# Patient Record
Sex: MALE | Race: WHITE | NOT HISPANIC OR LATINO | Employment: OTHER | URBAN - METROPOLITAN AREA
[De-identification: names, ages, dates, MRNs, and addresses within clinical notes are randomized per-mention and may not be internally consistent; named-entity substitution may affect disease eponyms.]

---

## 2018-06-06 ENCOUNTER — DOCUMENTATION (OUTPATIENT)
Dept: ONCOLOGY | Facility: CLINIC | Age: 83
End: 2018-06-06

## 2018-06-06 NOTE — PROGRESS NOTES
ONCOLOGY REFERRAL    Received a referral from Dr. Judd for CLL. Patient was seen in [place]. Called patient to inform them that we received the referral and they state that they will call me tomorrow with the name and number of the facility. Patient aware that we will review the records once received and call him back.

## 2018-06-11 ENCOUNTER — DOCUMENTATION (OUTPATIENT)
Dept: ONCOLOGY | Facility: CLINIC | Age: 83
End: 2018-06-11

## 2018-06-11 NOTE — PROGRESS NOTES
Oncology referral is reviewed.  Dr. Ghosh has requested CLL Fish, Quantitative Immunoglobin, Peripheral blood for flow cytometry.  I have called and given these recommendations to Dr. Judd's office.  I have also left a msg on pts vm to advise them of plan.

## 2018-06-19 ENCOUNTER — DOCUMENTATION (OUTPATIENT)
Dept: ONCOLOGY | Facility: CLINIC | Age: 83
End: 2018-06-19

## 2018-06-19 NOTE — PROGRESS NOTES
ONCOLOGY REFERRAL    Called to [facility] to see if the labs are back from [place]. There is nothing showing in their computers that they are back. Nurses station fax number given to them to fax results to when they are available.

## 2018-06-20 RX ORDER — MULTIVITAMIN
1 TABLET ORAL DAILY
COMMUNITY

## 2018-06-20 NOTE — PRE-PROCEDURE INSTRUCTIONS
Pre-Surgery Instructions:   Medication Instructions   • multivitamin (THERAGRAN) tablet tablet Do not take morning of surgery/procedure

## 2018-06-22 ENCOUNTER — HOSPITAL ENCOUNTER (OUTPATIENT)
Facility: HOSPITAL | Age: 83
Setting detail: OUTPATIENT SURGERY
Discharge: HOME | End: 2018-06-22
Attending: OTOLARYNGOLOGY | Admitting: OTOLARYNGOLOGY
Payer: MEDICARE

## 2018-06-22 ENCOUNTER — ANESTHESIA EVENT (OUTPATIENT)
Dept: OPERATING ROOM | Facility: HOSPITAL | Age: 83
End: 2018-06-22
Payer: MEDICARE

## 2018-06-22 ENCOUNTER — ANESTHESIA (OUTPATIENT)
Dept: OPERATING ROOM | Facility: HOSPITAL | Age: 83
End: 2018-06-22
Payer: MEDICARE

## 2018-06-22 VITALS
HEART RATE: 59 BPM | SYSTOLIC BLOOD PRESSURE: 138 MMHG | HEIGHT: 66 IN | BODY MASS INDEX: 34.16 KG/M2 | OXYGEN SATURATION: 90 % | TEMPERATURE: 98.6 F | WEIGHT: 212.52 LBS | RESPIRATION RATE: 10 BRPM | DIASTOLIC BLOOD PRESSURE: 77 MMHG

## 2018-06-22 DIAGNOSIS — C44.329 SQUAMOUS CELL CARCINOMA OF CHEEK: Primary | ICD-10-CM

## 2018-06-22 PROCEDURE — (BLANK) HC OR LEVEL 2 PROC 1ST 15MIN: Performed by: OTOLARYNGOLOGY

## 2018-06-22 PROCEDURE — 99100 ANES PT EXTEME AGE<1 YR&>70: CPT | Performed by: NURSE ANESTHETIST, CERTIFIED REGISTERED

## 2018-06-22 PROCEDURE — 6360000200 HC RX 636 W HCPCS (ALT 250 FOR IP): Performed by: NURSE ANESTHETIST, CERTIFIED REGISTERED

## 2018-06-22 PROCEDURE — 6360000200 HC RX 636 W HCPCS (ALT 250 FOR IP)

## 2018-06-22 PROCEDURE — 6360000200 HC RX 636 W HCPCS (ALT 250 FOR IP): Mod: JW | Performed by: NURSE ANESTHETIST, CERTIFIED REGISTERED

## 2018-06-22 PROCEDURE — 88332 PATH CONSLTJ SURG EA ADD BLK: CPT

## 2018-06-22 PROCEDURE — 88305 TISSUE EXAM BY PATHOLOGIST: CPT

## 2018-06-22 PROCEDURE — (BLANK) HC RECOVERY PHASE-2 1ST 1/2 HOUR ACUITY LEVEL 1: Performed by: OTOLARYNGOLOGY

## 2018-06-22 PROCEDURE — 2590000100 HC RX 259: Performed by: OTOLARYNGOLOGY

## 2018-06-22 PROCEDURE — 2500000200 HC RX 250 WO HCPCS: Performed by: NURSE ANESTHETIST, CERTIFIED REGISTERED

## 2018-06-22 PROCEDURE — 2580000300 HC RX 258: Performed by: ANESTHESIOLOGY

## 2018-06-22 PROCEDURE — 00300 ANES ALL PX INTEG H/N/PTRUNK: CPT | Performed by: NURSE ANESTHETIST, CERTIFIED REGISTERED

## 2018-06-22 PROCEDURE — (BLANK) HC OR LEVEL 2 PROC EACH ADDITIONAL MIN: Performed by: OTOLARYNGOLOGY

## 2018-06-22 PROCEDURE — 88331 PATH CONSLTJ SURG 1 BLK 1SPC: CPT

## 2018-06-22 PROCEDURE — (BLANK) HC RECOVERY PHASE-1 1ST  HOUR ACUITY LEVEL 3: Performed by: OTOLARYNGOLOGY

## 2018-06-22 RX ORDER — CEFAZOLIN SODIUM 1 G/3ML
INJECTION, POWDER, FOR SOLUTION INTRAMUSCULAR; INTRAVENOUS AS NEEDED
Status: DISCONTINUED | OUTPATIENT
Start: 2018-06-22 | End: 2018-06-22 | Stop reason: SURG

## 2018-06-22 RX ORDER — LIDOCAINE HYDROCHLORIDE AND EPINEPHRINE 10; 10 UG/ML; MG/ML
INJECTION, SOLUTION INFILTRATION; PERINEURAL AS NEEDED
Status: DISCONTINUED | OUTPATIENT
Start: 2018-06-22 | End: 2018-06-22 | Stop reason: HOSPADM

## 2018-06-22 RX ORDER — ROCURONIUM BROMIDE 50 MG/5 ML
SYRINGE (ML) INTRAVENOUS AS NEEDED
Status: DISCONTINUED | OUTPATIENT
Start: 2018-06-22 | End: 2018-06-22 | Stop reason: SURG

## 2018-06-22 RX ORDER — PROPOFOL 10 MG/ML
INJECTION, EMULSION INTRAVENOUS AS NEEDED
Status: DISCONTINUED | OUTPATIENT
Start: 2018-06-22 | End: 2018-06-22 | Stop reason: SURG

## 2018-06-22 RX ORDER — METOPROLOL TARTRATE 1 MG/ML
1 INJECTION, SOLUTION INTRAVENOUS EVERY 5 MIN PRN
Status: DISCONTINUED | OUTPATIENT
Start: 2018-06-22 | End: 2018-06-22 | Stop reason: HOSPADM

## 2018-06-22 RX ORDER — HYDROCODONE BITARTRATE AND ACETAMINOPHEN 5; 325 MG/1; MG/1
1-2 TABLET ORAL EVERY 4 HOURS PRN
Qty: 30 TABLET | Refills: 0 | Status: SHIPPED | OUTPATIENT
Start: 2018-06-22 | End: 2018-07-23

## 2018-06-22 RX ORDER — ACETAMINOPHEN 325 MG/1
TABLET ORAL
Status: COMPLETED
Start: 2018-06-22 | End: 2018-06-22

## 2018-06-22 RX ORDER — ONDANSETRON HYDROCHLORIDE 2 MG/ML
4 INJECTION, SOLUTION INTRAVENOUS ONCE
Status: COMPLETED | OUTPATIENT
Start: 2018-06-22 | End: 2018-06-22

## 2018-06-22 RX ORDER — LIDOCAINE HYDROCHLORIDE 20 MG/ML
INJECTION, SOLUTION EPIDURAL; INFILTRATION; INTRACAUDAL; PERINEURAL AS NEEDED
Status: DISCONTINUED | OUTPATIENT
Start: 2018-06-22 | End: 2018-06-22 | Stop reason: SURG

## 2018-06-22 RX ORDER — ACETAMINOPHEN 500 MG
1000 TABLET ORAL ONCE
Status: COMPLETED | OUTPATIENT
Start: 2018-06-22 | End: 2018-06-22

## 2018-06-22 RX ORDER — ONDANSETRON HYDROCHLORIDE 2 MG/ML
4 INJECTION, SOLUTION INTRAVENOUS ONCE AS NEEDED
Status: DISCONTINUED | OUTPATIENT
Start: 2018-06-22 | End: 2018-06-22 | Stop reason: HOSPADM

## 2018-06-22 RX ORDER — ACETAMINOPHEN 325 MG/1
650 TABLET ORAL ONCE
Status: COMPLETED | OUTPATIENT
Start: 2018-06-22 | End: 2018-06-22

## 2018-06-22 RX ORDER — FENTANYL CITRATE/PF 50 MCG/ML
PLASTIC BAG, INJECTION (ML) INTRAVENOUS AS NEEDED
Status: DISCONTINUED | OUTPATIENT
Start: 2018-06-22 | End: 2018-06-22 | Stop reason: SURG

## 2018-06-22 RX ORDER — SODIUM CHLORIDE, SODIUM LACTATE, POTASSIUM CHLORIDE, CALCIUM CHLORIDE 600; 310; 30; 20 MG/100ML; MG/100ML; MG/100ML; MG/100ML
100 INJECTION, SOLUTION INTRAVENOUS CONTINUOUS
Status: DISCONTINUED | OUTPATIENT
Start: 2018-06-22 | End: 2018-06-22 | Stop reason: HOSPADM

## 2018-06-22 RX ORDER — FENTANYL CITRATE/PF 50 MCG/ML
50 PLASTIC BAG, INJECTION (ML) INTRAVENOUS EVERY 5 MIN PRN
Status: DISCONTINUED | OUTPATIENT
Start: 2018-06-22 | End: 2018-06-22 | Stop reason: HOSPADM

## 2018-06-22 RX ORDER — ONDANSETRON HYDROCHLORIDE 2 MG/ML
INJECTION, SOLUTION INTRAVENOUS
Status: COMPLETED
Start: 2018-06-22 | End: 2018-06-22

## 2018-06-22 RX ADMIN — FENTANYL CITRATE 50 MCG: 50 INJECTION, SOLUTION INTRAMUSCULAR; INTRAVENOUS at 13:28

## 2018-06-22 RX ADMIN — Medication 325 MG: at 12:04

## 2018-06-22 RX ADMIN — SODIUM CHLORIDE, POTASSIUM CHLORIDE, SODIUM LACTATE AND CALCIUM CHLORIDE 100 ML/HR: 600; 310; 30; 20 INJECTION, SOLUTION INTRAVENOUS at 12:01

## 2018-06-22 RX ADMIN — ACETAMINOPHEN 325 MG: 325 TABLET ORAL at 12:01

## 2018-06-22 RX ADMIN — LIDOCAINE HYDROCHLORIDE 60 MG: 20 INJECTION, SOLUTION EPIDURAL; INFILTRATION; INTRACAUDAL; PERINEURAL at 12:16

## 2018-06-22 RX ADMIN — EPHEDRINE SULFATE 5 MG: 50 INJECTION, SOLUTION INTRAVENOUS at 12:39

## 2018-06-22 RX ADMIN — SUGAMMADEX 200 MG: 100 INJECTION, SOLUTION INTRAVENOUS at 14:07

## 2018-06-22 RX ADMIN — CEFAZOLIN SODIUM 2 G: 1 INJECTION, POWDER, FOR SOLUTION INTRAMUSCULAR; INTRAVENOUS at 12:23

## 2018-06-22 RX ADMIN — Medication 30 MG: at 12:16

## 2018-06-22 RX ADMIN — PROPOFOL 120 MG: 10 INJECTION, EMULSION INTRAVENOUS at 12:16

## 2018-06-22 RX ADMIN — SODIUM CHLORIDE, POTASSIUM CHLORIDE, SODIUM LACTATE AND CALCIUM CHLORIDE: 600; 310; 30; 20 INJECTION, SOLUTION INTRAVENOUS at 12:52

## 2018-06-22 RX ADMIN — Medication 100 MCG: at 12:26

## 2018-06-22 RX ADMIN — Medication 100 MCG: at 12:35

## 2018-06-22 RX ADMIN — PROPOFOL 10 MG: 10 INJECTION, EMULSION INTRAVENOUS at 13:58

## 2018-06-22 RX ADMIN — FENTANYL CITRATE 50 MCG: 50 INJECTION, SOLUTION INTRAMUSCULAR; INTRAVENOUS at 12:16

## 2018-06-22 RX ADMIN — ONDANSETRON HYDROCHLORIDE 4 MG: 2 INJECTION, SOLUTION INTRAVENOUS at 12:02

## 2018-06-22 RX ADMIN — ONDANSETRON 4 MG: 2 INJECTION INTRAMUSCULAR; INTRAVENOUS at 12:02

## 2018-06-22 RX ADMIN — Medication 50 MCG: at 12:50

## 2018-06-22 RX ADMIN — EPHEDRINE SULFATE 5 MG: 50 INJECTION, SOLUTION INTRAVENOUS at 12:50

## 2018-06-22 ASSESSMENT — ENCOUNTER SYMPTOMS: EXERCISE TOLERANCE: GOOD (4-7 METS)

## 2018-06-22 NOTE — DISCHARGE INSTRUCTIONS
SURGERY POST-OPERATIVE  PATIENT INSTRUCTIONS    Jose Alfredo Cheema MD  [facility] [phone]  [address] [place] [address] [address]  [place] [address]      FOLLOW-UP:  Please make an appointment with your physician in 7-10.  Call your physician immediately if you have any fevers greater than 102.5, drainage from your wound that is not clear or looks infected, persistent bleeding, or increasing pain.    WOUND CARE INSTRUCTIONS:    You may shower.  Some mild oozing of blood is expected.      DIET:  You may eat any foods that you can tolerate.  It is a good idea to eat a high fiber diet and take in plenty of fluids to prevent constipation.  If you do become constipated you may want to take a mild laxative or take ducolax tablets on a daily basis until your bowel habits are regular.      MEDICATIONS:  Try to take narcotic medications and anti-inflammatory medications, such as tylenol, ibuprofen, naprosyn, etc., with food.  This will minimize stomach upset from the medication.  Should you develop nausea and vomiting from the pain medication, or develop a rash, please discontinue the medication and contact your physician.  You should not drive, make important decisions, or operate machinery when taking narcotic pain medication.    QUESTIONS:  Please feel free to call your physician or the hospital  if you have any questions, and they will be glad to assist you. You can expect some generalized pain and tenderness around your incision

## 2018-06-22 NOTE — H&P
HISTORY AND PHYSICAL NOTE        HPI:   Simba Alcala is an 84 y.o. 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  He now has a new lesion over the right cheek that he noticed recently which was biopsied at an outside facility but showed invasive squamous cell carcinoma.      Prior to Admission medications    Medication Sig Start Date End Date Taking? Authorizing Provider   multivitamin (THERAGRAN) tablet tablet Take 1 tablet by mouth daily.   Yes Historical Provider, MD         No current facility-administered medications for this encounter.     No Known Allergies    Past Surgical History:   Procedure Laterality Date   • OTHER SURGICAL HISTORY Left 2008    LEFT EAR REMOVAL;  Mohs surgeries       History reviewed. No pertinent family history.    Past Medical History:   Diagnosis Date   • Cancer (CMS/HCC)     Prostate; CLL   • Kidney disease     40 years ago kidney stone removed   • Wears partial dentures        Social History     Social History   • Marital status:      Spouse name: N/A   • Number of children: N/A   • Years of education: N/A     Occupational History   • Not on file.     Social History Main Topics   • Smoking status: Never Smoker   • Smokeless tobacco: Never Used   • Alcohol use 8.4 oz/week     14 Shots of liquor per week   • Drug use: Unknown   • Sexual activity: Not on file     Other Topics Concern   • Not on file     Social History Narrative   • No narrative on file          REVIEW OF SYSTEMS:  Constitutional:  Negative for activity change, appetite change, chills, diaphoresis      and fever.   HENT:   Negative for congestion, ear discharge, ear pain, hearing loss,      sinus pressure, sore throat, trouble swallowing and voice change.    Eyes:    Negative for pain and visual disturbance.   Respiratory:   Negative for cough, choking, shortness of breath and stridor.    Cardiovascular:  Negative for chest pain and palpitations.   Skin:    Negative for color change  "and rash.   Allergic/  Immunologic:  Negative for environmental allergies and immunocompromised      state.   Neurological:  Negative for dizziness, syncope, facial asymmetry, speech      difficulty, weakness, light-headedness, numbness and      headaches.   Psychiatric/  Behavioral:   Negative for confusion and sleep disturbance.         Objective       /85   Pulse 69   Temp 36.7 °C (98.1 °F) (Oral)   Resp 16   Ht 1.676 m (5' 6\")   Wt 96.4 kg (212 lb 8.4 oz)   SpO2 95%   BMI 34.30 kg/m²     PHYSICAL EXAM:   General Appearance:    Alert, cooperative, no distress, appears stated age   Head:   Face:    Normocephalic, without obvious abnormality, atraumatic    Nonsyndromic, symmetric, atraumatic.  Evidence of past surger.  Right cheek with 1.5x1.5cm palpable lesion on the skin.     Eyes:    PERRL, conjunctiva/corneas clear, EOM's intact both eyes        Ears:    Right External auditory canal patent, skin in tact    Right TM in tact, no evidence of infection or effusion.    No mastoid tenderness or effusion    External ear (right) well formed without lesions or deformities.  Left ear surgically absent.     Nose:   Nares patent, septum midline, turbinates normal, mucosa pink and         moist, no drainage.  External nose well formed without lesions.   Oral:    Oropharynx:    Lips, mucosa, and tongue normal; teeth and gums normal, hard and soft palate in tact with normal development.    Tonsils 2+, nonerythematous, posterior pharyngeal wall smooth, no     masses or lesions   Neck:   Supple, symmetrical, trachea midline, no adenopathy. Thyroid   palpated with enlargement/tenderness/nodules.   Lungs:     Clear to auscultation bilaterally, respirations unlabored, no stridor   Heart:    Regular rate and rhythm, S1 and S2 normal, no murmur, rub or gallop   Neurologic:   CNII-XII intact. Face symmetric bilaterally.          X-ray Chest 2 Views    Result Date: 6/4/2018  Narrative: EXAM: Chest X-ray, two views, " Upright PA and Lateral from 06/04/2018      . CLINICAL HISTORY: Bronchitis; COMPARISON: None FINDINGS: There is clear aeration of the lungs with a normal inspiratory level. There is a normal size of the heart and pulmonary vessels. There is no pleural fluid. There are surgical clips in the left supraclavicular region.     Impression: IMPRESSION: No acute abnormality.     Ct Soft Tissue Neck With Iv Contrast    Result Date: 6/15/2018  Narrative: Exam: CT of the Neck with contrast from 06/15/2018. Clinical History:  squamous cell carcinoma Comparison(s):  None available. Technique: During the bolus administration of 75 cc of Isovue-370, helical axial imaging was performed through the neck. Dose reduction technique utilized; automatic/anatomic modulation of X-ray tube current (Auto mA). Findings: Patient has apparently had previous resection of the left year with mastoidectomy. Associated surgical changes. There are several mildly enlarged cervical lymph nodes. Mostly these are submandibular and submental but also in the left anterior jugular chain mid neck and the right level 3 and 4 location. There are several mildly enlarged supraclavicular nodes bilaterally. Airway and larynx appear maintained. There is a subcutaneous nodule overlying the right parotid gland. Left parotid gland has been removed.     Impression: IMPRESSION: 1. Multiple mild to moderately enlarged lymph nodes seen diffusely throughout the neck and in the supraclavicular fossa bilaterally. Metastatic disease and lymphoproliferative disorder would both be possibilities. 2. Subcutaneous nodule overlies the right anterior parotid gland with contiguity with the skin. This measures 1 cm. Etiology uncertain.      Active Problems:  No Active Problems: There are no active problems currently on the Problem List. Please update the Problem List and refresh.        ASSESSMENT and PLAN  Simba Alcala is a 84 y.o. male with hx of right cheek squamous cell  carcinoma.  Risks, benefits and alternatives to wide local excision discussed.  He would like to proceed.         DIANE CULVER MD  11:55 AM, 6/22/2018.

## 2018-06-22 NOTE — ANESTHESIA POSTPROCEDURE EVALUATION
Patient: Simba Alcala    Procedure Summary     Date:  06/22/18 Room / Location:  [facility] OR 03 / [facility] OR    Anesthesia Start:  1206 Anesthesia Stop:  1426    Procedure:  WIDE LOCAL EXCISION RIGHT CHEEK CANCER (Right ) Diagnosis:       SCC (squamous cell carcinoma)      (SCC (squamous cell carcinoma) [C44.92])    Surgeon:  Jose Alfredo Cheema MD Responsible Provider:  Donavan Schafer MD    Anesthesia Type:  general ASA Status:  3          Anesthesia Type: general  Last vitals  /79 (06/22/18 1424)    Temp 37 °C (98.6 °F) (06/22/18 1424)    Pulse 62 (06/22/18 1424)   Resp (!) 25 (06/22/18 1424)    SpO2 93 % (06/22/18 1424)    Pain Score        Anesthesia Post Evaluation    Patient location during evaluation: PACU  Patient participation: complete - patient participated  Level of consciousness: awake and alert  Pain management: adequate  Airway patency: patent  Anesthetic complications: no  Cardiovascular status: acceptable  Respiratory status: acceptable  Hydration status: acceptable  May dismiss recovered patient based on consultation with the appropriate physicians and/or meeting appropriate discharge criteria

## 2018-06-22 NOTE — OP NOTE
DATE OF PROCEDURE:      6/22/2018     SURGEON:      Dr. Jose Alfredo Cheema MD     PREOPERATIVE DIAGNOSIS:      1. Right cheek squamous cell carcinoma    POSTOPERATIVE DIAGNOSIS:     1. Right cheek squamous cell carcinoma     PROCEDURES:     1. Wide local excision right cheek squamous cell carcinoma.   2. Bilateral advancement flap closure of right cheek defect 4x6cm    ANESTHESIA:     General    ESTIMATED BLOOD LOSS:     10cc    INDICATIONS AND CONSENT:     The patient presented with the above diagnoses.  Risks benefits and alternatives of the above procedures were discussed with the family who consented for surgery.     COMPLICATIONS:     None     OPERATIVE DETAILS:      Informed consent was reviewed. The patient was delivered to the OR and placed in the supine position.  General anesthesia was induced.  The area over the right parotid cheek was palpated as a 1.5cm x 1.5cm nodule.  An elliptical incision was planned in this area measuring 3x4cm.  The area was infiltrated with 1% lidocaine with 1:100,000 epinephrine.  The patient was prepped and draped in the usual fashion.  An operative timeout was performed.     I made my incision in a relaxed skin line as previously planned.  Incision was taken through the epidermal and dermal layers.  The underlying preparotid fascia was visualized.  A roughly 2cm margin around the gross disease was obtained.  The specimen was circumferencially dissected and excised at the parotid fascia.  It was sent en bloc to pathology for frozen analysis.  Margins were also sent.  Due to positive margins on froze, more skin was excised and sent for permanent analysis.  The wound was irrigated thoroughly.  All bleeding points were controlled.        I then proceeded with closing the wound.  Significant undermining of the bilateral skin flaps was performed in order to obtain bilateral advancement flap closure of the 4x6cm defect.  After a tension free closure was obtained, 4-0 vicryl suture was used  to close the platysma layer followed by the dermis and epidermis.  Dermabond was used for the skin.  This concluded the procedure.  The patient was turned over to the anesthesia team for recovery.

## 2018-06-22 NOTE — ANESTHESIA PREPROCEDURE EVALUATION
"Pre-Procedure Assessment    Patient: Simba Alcala, male, 84 y.o.    Ht Readings from Last 1 Encounters:   06/22/18 1.676 m (5' 6\")     Wt Readings from Last 1 Encounters:   06/22/18 96.4 kg (212 lb 8.4 oz)       Last Vitals  /85 (06/22/18 1101)    Temp 36.7 °C (98.1 °F) (06/22/18 1101)    Pulse 69 (06/22/18 1101)   Resp 16 (06/22/18 1101)    SpO2 95 % (06/22/18 1101)    Pain Score         Problem list reviewed and Medical history reviewed           Airway   Mallampati: II  TM distance: >3 FB  Neck ROM: full      Dental      Pulmonary - negative ROS and normal exam   Cardiovascular - negative ROS  Exercise tolerance: good (4-7 METS)    Rhythm: regular  Rate: normal    Mental Status/Neuro/Psych - negative ROS   Pt is alert.        GI/Hepatic/Renal - negative ROS     Endo/Other    (+) history of cancer,   Abdominal           Social History   Substance Use Topics   • Smoking status: Never Smoker   • Smokeless tobacco: Never Used   • Alcohol use 8.4 oz/week     14 Shots of liquor per week      Hematology No results found for: WBC, RBC, MCV, HGB, HCT, PLT   Coagulation No results found for: PT, APTT, INR   General Chemistry No results found for: POCGLU, CALCIUM, BUN, CREATININE, GLUCOSE, NA, K, MG, CO2, CL, DIGOXIN  Anesthesia Plan    ASA 3   NPO status reviewed: > 6 hours    General         Induction: intravenous   Airway Planning: oral ET tube          Plan for postoperative opioid use.    Anesthetic plan and risks discussed with patient.                "

## 2018-07-22 NOTE — PROGRESS NOTES
"Asked to see patient for CLL.    This is a gentleman whose cll was diagnosed  In 2006 when he had prostate surgery.  At that time, he had a number of nodes at prostatectomy which were positive for \"lymphoma\".  He was seen by an oncologist and, despite the presence of the nodes, he was said to have stage 0 disease.  He has never been treated for the disease.  He was followed by oncology in [place] until 2010, where they returned him to follow with his pcp.  At that time, he had a wbc of 18 k and normal platelets.       He has had recurrent problems with skin cancer, one of which was noted on the right lower face/cheek and was biopsied in [place] showing squamous carcinoma.  He was due to come see his son, who lives locally (comes for a few months in the summer) so he underwent surgery with Dr. Cheema.  Prior to his surgery, he noted a cough and, since he did not want surgery delayed, he went to [facility] urgent care where he was given antibiotics.  A CBC was drawn, showing 48.8 white cell count with 89 percent lymphocytes, Hb was normal and platelets were 118K (no MPV given).      On 6/22, he underwent a wide local excision of the right cheek squamous carcinoma with flap closure.  After that procedure, he had a PET which showed a prominent nodes in the neck without excess FDG uptake.  Low level activity was seen at the operative site.  He was noted to have splenomegaly and prominent bilateral inguinal nodes and slight increased FDG activity in the thyroid.  US of the thyroid was felt to show a benign appearing nodule with no further followup recommended.      He is here out of concern for the increase in his white count.      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  Hyperlipidemia, " basal cell carcinoma of the skin.    Medications are as per EPIC record and are reviewed.    Allergies are none    Family history not contributory    Social history: lives most of the year in [place].  Accompanied by wife and living currently with son at [place].  Never smoker, drinks about 2 shots of alcohol a day.  Retired.      ROS  Otherwise negative on 14 points except for some sores around the lip line.  He has been using cortisone cream on them but they have not improved.    On exam, well developed white man in NAD.  Multiple areas of previously excised skin cancer.  Recent surgical site on right cheek.  VS as per rooming section.    Anicteric.  Left ear resected.  PERRL, EOMI, OP clear with no adenopathy in Waldeyer's ring.  Few herpetic looking lesions around lower lip.  No thrush.  Neck is supple without adenopathy in right anterior or posterior cervical (left s/p neck dissection), internal jugular, supra or infraclavicular, axillary areas.  Chest is clear bilaterally.  CV regular without M, R, or G.  Abd is soft, NT.  Cannot detect HSM due to habitus.  Ext without CCE.  No lymphedema.  Neuro Alert and oriented.  Slightly hard of hearing.  Sensorium intact.  Speech normal in content and miroslava.      Flow cytometry, CBC and PET reviewed.      Impression:    This is a gentleman with a history of stage I CLL who presents with white count more elevated than it has been in the past.  He has recently had a surgical procedure and this could account for some of the rise in white count but this could also reflect natural progression of the disease.  More concerning is the slight decline in platelet count.  I find no acute indications for treatment---elevated white count alone is not an indication for treatment.  He lacks evidence for painful or obstructing adenopathy.      The decline in platelet count is concerning however.  I would like to recheck his platelet count and ascertain whether the decline in  platelet count is due to splenomegaly (which I doubt since his hemoglobin is normal) or due to development of antiplatelet antibody (ITP) as is common in CLL.  I also recommend rechecking a platelet count at about the 6 week conner---he will leave for [place] soon after that and if platelets persist low or decline further, he may need evaluation by a heme/onc on his return.      We discussed general recommendations for persons with CLL--he is faithful in getting flu vaccine and has received pneumovax. I advised early intervention for febrile illnesses.    I explained all of the above to the patient and his wife and they voice understanding and wish to proceed with this plan of care.  I answered his and his wife's questions to the best of my ability.      With respect to the lesions around his lower lip, I suspect they are herpetic.  I recommended zovirax ointment and placed a prescription.    Thank you for allowing me to meet this very nice man.      ADDENDUM:  White count 43.8 with ANC 35.9K.  Hb 14.1, platelets 94K with normal MPV.  This looks more consistent with splenic sequestration but await cell bound platelet antibody testing (ordered)

## 2018-07-23 ENCOUNTER — OFFICE VISIT NO LOS (OUTPATIENT)
Dept: ONCOLOGY | Facility: CLINIC | Age: 83
End: 2018-07-23
Payer: MEDICARE

## 2018-07-23 ENCOUNTER — OFFICE VISIT (OUTPATIENT)
Dept: ONCOLOGY | Facility: CLINIC | Age: 83
End: 2018-07-23
Payer: MEDICARE

## 2018-07-23 ENCOUNTER — APPOINTMENT (OUTPATIENT)
Dept: ONCOLOGY | Facility: CLINIC | Age: 83
End: 2018-07-23
Payer: MEDICARE

## 2018-07-23 VITALS
SYSTOLIC BLOOD PRESSURE: 156 MMHG | OXYGEN SATURATION: 94 % | DIASTOLIC BLOOD PRESSURE: 67 MMHG | HEIGHT: 66 IN | BODY MASS INDEX: 34.23 KG/M2 | HEART RATE: 56 BPM | TEMPERATURE: 98.9 F | WEIGHT: 213 LBS

## 2018-07-23 DIAGNOSIS — C91.10 CLL (CHRONIC LYMPHOCYTIC LEUKEMIA) (CMS/HCC): ICD-10-CM

## 2018-07-23 DIAGNOSIS — C91.10 CLL (CHRONIC LYMPHOCYTIC LEUKEMIA) (CMS/HCC): Primary | ICD-10-CM

## 2018-07-23 LAB
BASOPHILS # BLD MANUAL: 0.4 10*3/UL
BASOPHILS NFR BLD MANUAL: 1 % (ref 0–2)
ERYTHROCYTE [DISTWIDTH] IN BLOOD BY AUTOMATED COUNT: 15.1 % (ref 11.5–15)
HCT VFR BLD AUTO: 41.7 % (ref 38–50)
HGB BLD-MCNC: 14.1 G/DL (ref 13.2–17.2)
LYMPHOCYTES # BLD MANUAL: 35.9 10*3/UL
LYMPHOCYTES NFR BLD MANUAL: 82 % (ref 15–47)
MCH RBC QN AUTO: 32.5 PG (ref 29–34)
MCHC RBC AUTO-ENTMCNC: 33.9 G/DL (ref 32–36)
MCV RBC AUTO: 96 FL (ref 82–97)
NEUTROPHILS # BLD MANUAL: 3.5 10*3/UL
NEUTS SEG # BLD MANUAL: 3.5 10*3/UL
NEUTS SEG NFR BLD MANUAL: 8 % (ref 46–70)
PLATELET # BLD AUTO: 94 10*3/UL (ref 130–350)
PLATELET # BLD EST: (no result) 10*3/UL
PMV BLD AUTO: 8.9 FL (ref 6.9–10.8)
RBC # BLD AUTO: 4.34 10*6/ΜL (ref 4.1–5.8)
RBC MORPH BLD: NORMAL
TOTAL CELLS COUNTED BLD: 100 CELLS
VARIANT LYMPHS # BLD MANUAL: 3.9 10*3/UL
VARIANT LYMPHS NFR BLD: 9 % (ref 0–1)
WBC # BLD AUTO: 43.8 10*3/UL (ref 3.7–9.6)
WBC NRBC COR # BLD: 43.8 10*3/UL

## 2018-07-23 PROCEDURE — 85025 COMPLETE CBC W/AUTO DIFF WBC: CPT | Performed by: INTERNAL MEDICINE

## 2018-07-23 PROCEDURE — 99202 OFFICE O/P NEW SF 15 MIN: CPT

## 2018-07-23 PROCEDURE — 99205 OFFICE O/P NEW HI 60 MIN: CPT | Performed by: INTERNAL MEDICINE

## 2018-07-23 PROCEDURE — 36415 COLL VENOUS BLD VENIPUNCTURE: CPT

## 2018-07-23 RX ORDER — HYDROCORTISONE 1 %
1 CREAM (GRAM) TOPICAL 2 TIMES DAILY
COMMUNITY
End: 2019-09-17 | Stop reason: DRUGHIGH

## 2018-07-23 RX ORDER — FLUOROURACIL 50 MG/G
1 CREAM TOPICAL AS NEEDED
COMMUNITY
End: 2019-06-19 | Stop reason: ALTCHOICE

## 2018-07-23 RX ORDER — ACYCLOVIR 50 MG/G
1 OINTMENT TOPICAL
Qty: 15 G | Refills: 0 | Status: SHIPPED | OUTPATIENT
Start: 2018-07-23 | End: 2018-07-30

## 2018-07-23 ASSESSMENT — PAIN SCALES - GENERAL: PAINLEVEL: 0-NO PAIN

## 2018-07-23 NOTE — PSYCHOTHERAPY
Introductory Visit     met with patient and his wife, Dilcia.  explained the role of the Patient Support Team. Patient did not have any questions or concerns at this time.     No needs identified at this time.  will assist as needed.

## 2018-07-27 ENCOUNTER — OFFICE VISIT NO LOS (OUTPATIENT)
Dept: RADIATION ONCOLOGY | Facility: CLINIC | Age: 83
End: 2018-07-27
Payer: MEDICARE

## 2018-07-27 ENCOUNTER — APPOINTMENT (OUTPATIENT)
Dept: ONCOLOGY | Facility: CLINIC | Age: 83
End: 2018-07-27
Payer: MEDICARE

## 2018-07-27 VITALS
WEIGHT: 213 LBS | HEIGHT: 65 IN | BODY MASS INDEX: 35.49 KG/M2 | DIASTOLIC BLOOD PRESSURE: 75 MMHG | OXYGEN SATURATION: 96 % | HEART RATE: 56 BPM | SYSTOLIC BLOOD PRESSURE: 163 MMHG | TEMPERATURE: 97.3 F

## 2018-07-27 DIAGNOSIS — C44.329 SQUAMOUS CELL CARCINOMA OF SKIN OF CHEEK: Primary | ICD-10-CM

## 2018-07-27 DIAGNOSIS — C91.10 CLL (CHRONIC LYMPHOCYTIC LEUKEMIA) (CMS/HCC): ICD-10-CM

## 2018-07-27 PROCEDURE — 99204 OFFICE O/P NEW MOD 45 MIN: CPT

## 2018-07-27 PROCEDURE — 36415 COLL VENOUS BLD VENIPUNCTURE: CPT

## 2018-07-27 ASSESSMENT — PAIN SCALES - GENERAL: PAINLEVEL: 0-NO PAIN

## 2018-07-27 NOTE — CONSULTATION
Radiation Oncology Consult    Requesting Physician: Jose Alfredo Cheema MD  Date of Service: 7/27/2018    Simba Alcala presents today for consultation as requested by Jose Alfredo Cheema MD, regarding treatment of:  Encounter Diagnosis   Name Primary?   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 male who is seen in consultation today for skin cancer involving the right cheek.  Patient has history of numerous skin cancers including aggressive and recurrent disease in the left side of the face around the left ear which required removal of the left ear with skin grafting a number of years ago.  He was recently found to have a nodule or mass just anterior to the right ear which was biopsied in [place] in the spring and was found to be a squamous cell carcinoma.  The patient [facility] his summ[facility] and when he arrived he made arrangements to see Dr. Cheema.  On June 22 the patient had wide excision of the mass.  Pathology reveals invasive moderately differentiated squamous cell carcinoma.  The tumor initially was focally involving the medial and lateral margins.  The tumor was also close to the deep margin and less than 1 mm.  Additional margins were taken including an anterior margin which was negative for malignancy and a reexcision which revealed actinic keratosis and hypertrophic scar but no malignancy.  Final margins are considered negative.    The patient also has a history of CLL which has not been treated.  A previous CT scan revealed lymphadenopathy in the neck on both sides.  PET scan was ordered and the patient was referred to our clinic to discuss the possibility of adjuvant treatment.  The PET scan reveals low-level FDG activity at the right cheek consistent with postsurgical change.  There is no definite evidence of regional or distant metastatic disease.  Enlarged lymph nodes in numerous locations are not FDG  avid and are potentially consistent with CLL activity.  There is some increased FDG activity in the left thyroid lobe.  Ultrasound of the thyroid was then obtained and is apparently considered benign with no further evaluation recommended.  The patient has followed locally in medical oncology for management of his CLL which again has not required treatment.  He is healing well from his surgery and has no complaints.  He and his wife have not noticed any evidence of recurrence at the site of recent resection.    PAST RADIATION HISTORY: Yes Radiation Treatments Patient's record has no active or historical radiation treatments documented.    CARDIAC DEVICE: No    PAST MEDICAL HISTORY:   Past Medical History:   Diagnosis Date   • Cancer (CMS/HCC)     Prostate; CLL   • Kidney disease     40 years ago kidney stone removed   • Wears partial dentures         PAST SURGICAL HISTORY:   Past Surgical History:   Procedure Laterality Date   • OTHER SURGICAL HISTORY Left 2008    LEFT EAR REMOVAL;  Mohs surgeries   • SKIN LESION EXCISION Right 06/22/2018    Squamous cell R cheek        GYNECOLOGICAL HISTORY (if applicable): NA     FAMILY HISTORY:   Family History   Problem Relation Age of Onset   • Cancer Brother         mouth cancer/unknown   • Heart attack Mother    • Brain cancer Father         SOCIAL HISTORY:   Social History     Social History   • Marital status:      Spouse name: N/A   • Number of children: N/A   • Years of education: N/A     Occupational History   • Not on file.     Social History Main Topics   • Smoking status: Never Smoker   • Smokeless tobacco: Never Used   • Alcohol use 8.4 oz/week     14 Shots of liquor per week   • Drug use: Unknown   • Sexual activity: Not on file     Other Topics Concern   • Not on file     Social History Narrative   • No narrative on file    Counseling given: Not Answered      ALLERGIES:   Allergies as of 07/27/2018   • (No Known Allergies)        MEDICATIONS:   Current  "Outpatient Prescriptions   Medication Sig Dispense Refill   • acyclovir (ZOVIRAX) 5 % ointment Apply 1 application topically 6 (six) times a day for 7 days. Space applications every 3 hours. 15 g 0   • fluorouracil (EFUDEX) 5 % cream Apply 1 application topically as needed.     • hydrocortisone 1 % cream Apply 1 application topically 2 (two) times a day.     • multivitamin (THERAGRAN) tablet tablet Take 1 tablet by mouth daily.     • pyridoxine HCl, vitamin B6, (VITAMIN B-6 ORAL) Take 1 tablet by mouth daily.     • vit A,C & E-lutein-minerals (OCUVITE WITH LUTEIN) 1,000 unit-200 mg-60 unit-2 mg tablet Take 1 tablet by mouth daily.       No current facility-administered medications for this visit.        REVIEW OF SYSTEMS:   Review of Systems   All other systems reviewed and are negative.  consult questionnaire reviewed      The Karnofsky performance scale today is 90, Able to carry on normal activity; minor signs or symptoms of disease (ECOG equivalent 0).     PAIN: None          Objective    PHYSICAL EXAM:   /75   Pulse 56   Temp 36.3 °C (97.3 °F)   Ht 1.651 m (5' 5\")   Wt 96.6 kg (213 lb)   SpO2 96%   BMI 35.45 kg/m²    Body mass index is 35.45 kg/m².     Physical Exam   Constitutional: No distress.   Skin: He is not diaphoretic.   numerous surgical scars on head and neck including large graft replacing left ear.  Most recent surgical incision is healing well with no evidence of local recurrence.  There is some soft tissue edema around the superior extent of the surgical site.      DIAGNOSTIC REPORTS REVIEWED:   Imaging: As above  Laboratory/Pathology: As above  Procedure: None      Assessment/Plan    IMPRESSION:   84-year-old man with multiple skin cancers who has undergone a recent resection for squamous cell cancer of the right cheek with a close deep margin    PLAN:   While the deep margin is close, additional tissue removed at the time of the wide excision was negative and final margins are " considered negative.  There is no evidence of perineural invasion or other risk factors noted on the pathology report.  I have not recommended adjuvant radiation but have counseled the patient and his wife that there is a risk of local recurrence which could be 5-10%.  They will need to observe the area closely.  He will also need to follow closely with dermatology for regular skin examinations.  He will continue in medical oncology as he manages his CLL.  Patient and his wife are comfortable with these recommendations and will contact me with questions or concerns as needed.       Physician: LIZZIE DRAKE MD

## 2018-07-30 ENCOUNTER — APPOINTMENT (OUTPATIENT)
Dept: ONCOLOGY | Facility: CLINIC | Age: 83
End: 2018-07-30
Payer: MEDICARE

## 2018-07-30 DIAGNOSIS — C91.10 CLL (CHRONIC LYMPHOCYTIC LEUKEMIA) (CMS/HCC): ICD-10-CM

## 2018-07-30 PROCEDURE — 84999 UNLISTED CHEMISTRY PROCEDURE: CPT

## 2018-07-30 PROCEDURE — 86023 IMMUNOGLOBULIN ASSAY: CPT

## 2018-07-30 PROCEDURE — 36415 COLL VENOUS BLD VENIPUNCTURE: CPT

## 2018-08-01 LAB — MISC MAYO RESULT(REF): NORMAL

## 2018-09-04 ENCOUNTER — CLINICAL SUPPORT (OUTPATIENT)
Dept: ONCOLOGY | Facility: CLINIC | Age: 83
End: 2018-09-04
Payer: MEDICARE

## 2018-09-04 ENCOUNTER — APPOINTMENT (OUTPATIENT)
Dept: ONCOLOGY | Facility: CLINIC | Age: 83
End: 2018-09-04
Payer: MEDICARE

## 2018-09-04 DIAGNOSIS — C91.10 CLL (CHRONIC LYMPHOCYTIC LEUKEMIA) (CMS/HCC): Primary | ICD-10-CM

## 2018-09-04 DIAGNOSIS — C91.10 CLL (CHRONIC LYMPHOCYTIC LEUKEMIA) (CMS/HCC): ICD-10-CM

## 2018-09-04 LAB
ERYTHROCYTE [DISTWIDTH] IN BLOOD BY AUTOMATED COUNT: 15.1 % (ref 11.5–15)
HCT VFR BLD AUTO: 40.1 % (ref 38–50)
HGB BLD-MCNC: 13.6 G/DL (ref 13.2–17.2)
LYMPHOCYTES # BLD MANUAL: 42.9 10*3/UL
LYMPHOCYTES NFR BLD MANUAL: 93 % (ref 15–47)
MCH RBC QN AUTO: 32.1 PG (ref 29–34)
MCHC RBC AUTO-ENTMCNC: 34 G/DL (ref 32–36)
MCV RBC AUTO: 94.3 FL (ref 82–97)
MONOCYTES # BLD MANUAL: 0.5 10*3/UL
MONOCYTES NFR BLD MANUAL: 1 % (ref 5–13)
NEUTROPHILS # BLD MANUAL: 2.77 10*3/UL
NEUTS SEG # BLD MANUAL: 2.8 10*3/UL
NEUTS SEG NFR BLD MANUAL: 6 % (ref 46–70)
PLATELET # BLD AUTO: 126 10*3/UL (ref 130–350)
PLATELET # BLD EST: (no result) 10*3/UL
PMV BLD AUTO: 9.2 FL (ref 6.9–10.8)
RBC # BLD AUTO: 4.25 10*6/ΜL (ref 4.1–5.8)
RBC MORPH BLD: NORMAL
TOTAL CELLS COUNTED BLD: 100 CELLS
WBC # BLD AUTO: 46.1 10*3/UL (ref 3.7–9.6)
WBC NRBC COR # BLD: 46.1 10*3/UL

## 2018-09-04 PROCEDURE — 85025 COMPLETE CBC W/AUTO DIFF WBC: CPT

## 2018-09-04 PROCEDURE — 36415 COLL VENOUS BLD VENIPUNCTURE: CPT

## 2018-09-04 NOTE — PROGRESS NOTES
MO RN REVIEW    Patient's wife aware of results and verbalizes understanding. Wife states that the patient is always tired and takes a nap daily. Patient is leaving the 28th to go to [place] and will not be back until May or June. KW aware of the above and has reviewed the labs.

## 2018-09-05 DIAGNOSIS — C91.10 CLL (CHRONIC LYMPHOCYTIC LEUKEMIA) (CMS/HCC): Primary | ICD-10-CM

## 2018-09-20 ENCOUNTER — APPOINTMENT (OUTPATIENT)
Dept: ONCOLOGY | Facility: CLINIC | Age: 83
End: 2018-09-20
Payer: MEDICARE

## 2018-09-20 ENCOUNTER — OFFICE VISIT (OUTPATIENT)
Dept: ONCOLOGY | Facility: CLINIC | Age: 83
End: 2018-09-20
Payer: MEDICARE

## 2018-09-20 VITALS
TEMPERATURE: 98.6 F | WEIGHT: 208.8 LBS | HEART RATE: 56 BPM | HEIGHT: 66 IN | DIASTOLIC BLOOD PRESSURE: 76 MMHG | OXYGEN SATURATION: 96 % | SYSTOLIC BLOOD PRESSURE: 155 MMHG | BODY MASS INDEX: 33.56 KG/M2

## 2018-09-20 DIAGNOSIS — C91.10 CLL (CHRONIC LYMPHOCYTIC LEUKEMIA) (CMS/HCC): ICD-10-CM

## 2018-09-20 DIAGNOSIS — C91.10 CLL (CHRONIC LYMPHOCYTIC LEUKEMIA) (CMS/HCC): Primary | ICD-10-CM

## 2018-09-20 DIAGNOSIS — C44.329 SQUAMOUS CELL CARCINOMA OF SKIN OF CHEEK: ICD-10-CM

## 2018-09-20 PROBLEM — D69.6 THROMBOCYTOPENIA (CMS/HCC): Status: ACTIVE | Noted: 2018-09-20

## 2018-09-20 LAB
ALBUMIN SERPL-MCNC: 4 G/DL (ref 3.5–5.3)
ALP SERPL-CCNC: 70 U/L (ref 45–115)
ALT SERPL-CCNC: 16 U/L (ref 0–52)
ANION GAP SERPL CALC-SCNC: 7 MMOL/L (ref 3–11)
AST SERPL-CCNC: 17 U/L (ref 0–39)
BILIRUB SERPL-MCNC: 0.66 MG/DL (ref 0–1.4)
BUN SERPL-MCNC: 26 MG/DL (ref 7–25)
CALCIUM ALBUM COR SERPL-MCNC: 8.9 MG/DL (ref 8.5–10.1)
CALCIUM SERPL-MCNC: 8.9 MG/DL (ref 8.6–10.3)
CHLORIDE SERPL-SCNC: 104 MMOL/L (ref 98–107)
CO2 SERPL-SCNC: 26 MMOL/L (ref 21–32)
CREAT SERPL-MCNC: 0.96 MG/DL (ref 0.7–1.3)
EOSINOPHIL # BLD MANUAL: 2.2 10*3/UL
EOSINOPHIL NFR BLD MANUAL: 4 % (ref 0–3)
ERYTHROCYTE [DISTWIDTH] IN BLOOD BY AUTOMATED COUNT: 15.3 % (ref 11.5–15)
GFR SERPL CREATININE-BSD FRML MDRD: 75 ML/MIN/1.73M*2
GLUCOSE SERPL-MCNC: 96 MG/DL (ref 70–105)
HCT VFR BLD AUTO: 43.4 % (ref 38–50)
HGB BLD-MCNC: 13.9 G/DL (ref 13.2–17.2)
LDH SERPL L TO P-CCNC: 140 U/L (ref 87–246)
LYMPHOCYTES # BLD MANUAL: 47.6 10*3/UL
LYMPHOCYTES NFR BLD MANUAL: 88 % (ref 15–47)
MCH RBC QN AUTO: 31.2 PG (ref 29–34)
MCHC RBC AUTO-ENTMCNC: 32.1 G/DL (ref 32–36)
MCV RBC AUTO: 96.9 FL (ref 82–97)
NEUTROPHILS # BLD MANUAL: 4.33 10*3/UL
NEUTS SEG # BLD MANUAL: 4.3 10*3/UL
NEUTS SEG NFR BLD MANUAL: 8 % (ref 46–70)
PLATELET # BLD AUTO: 126 10*3/UL (ref 130–350)
PLATELET # BLD EST: ADEQUATE 10*3/UL
PMV BLD AUTO: 9.2 FL (ref 6.9–10.8)
POTASSIUM SERPL-SCNC: 4.1 MMOL/L (ref 3.5–5.1)
PROT SERPL-MCNC: 6.6 G/DL (ref 6–8.3)
RBC # BLD AUTO: 4.48 10*6/ΜL (ref 4.1–5.8)
RBC MORPH BLD: NORMAL
SODIUM SERPL-SCNC: 137 MMOL/L (ref 135–145)
TOTAL CELLS COUNTED BLD: 100 CELLS
WBC # BLD AUTO: 54.1 10*3/UL (ref 3.7–9.6)
WBC NRBC COR # BLD: 54.1 10*3/UL

## 2018-09-20 PROCEDURE — 85027 COMPLETE CBC AUTOMATED: CPT

## 2018-09-20 PROCEDURE — 36415 COLL VENOUS BLD VENIPUNCTURE: CPT

## 2018-09-20 PROCEDURE — 99212 OFFICE O/P EST SF 10 MIN: CPT

## 2018-09-20 PROCEDURE — 83615 LACTATE (LD) (LDH) ENZYME: CPT

## 2018-09-20 PROCEDURE — 85007 BL SMEAR W/DIFF WBC COUNT: CPT

## 2018-09-20 PROCEDURE — 99214 OFFICE O/P EST MOD 30 MIN: CPT | Performed by: INTERNAL MEDICINE

## 2018-09-20 PROCEDURE — 80053 COMPREHEN METABOLIC PANEL: CPT

## 2018-09-20 RX ORDER — CEPHRADINE 250 MG
CAPSULE ORAL
COMMUNITY

## 2018-09-20 ASSESSMENT — PAIN SCALES - GENERAL: PAINLEVEL: 0-NO PAIN

## 2018-09-20 NOTE — PROGRESS NOTES
CLL diagnosed in 2006 with lymphadenopathy.  His disease was called stage 0 despite lymphadenopathy.  Followed in [place] until 2010.  White count was 18,000 at that time and platelets were normal.    Returned to [place] and at time of resection of a squamous cancer of his face he had elevated white count and platelets of 118.  Seen by me in July.  White count has hovered between 48,050 6000 now and platelets somewhat low.  Lowest platelet count 94,000.  Workup for ITP was negative.  No clinical bleeding.  PET scan done at the time of his squamous carcinoma showing prominent nodes in the neck without excess FDG uptake as well as splenomegaly, bilateral inguinal nodes.    Squamous carcinomas of the skin on multiple occasions with involvement of left ear requiring removal of ear and squamous carcinoma on the right lateral face requiring resection.    He is seen today.  His wife notes that his energy level seems to be decreasing.  He seems to be slowing down somewhat.  He has had no other new problems.  He has had no headaches or bone pain, fever, chills, sweats.  He reports that he is able to do what he wants to do but if he is allowed to come in and just watch television he is tired enough to want to take a nap.    He denies any shortness of breath, cough, fever.    Past, family, and social history is as in my initial consultation.  It is without change.    Review of systems is otherwise negative for headaches, bone pain, fever, chills, sweats, bleeding, petechiae, purpura, blood in the stool, blood in the urine, nosebleeds.  Otherwise negative on 14 points.    On exam he is an alert pleasant gentleman in no acute distress.  He has had multiple skin resections.  He has HEENT exam is notable for multiple resections of carcinomas including a resection involving the left ear.  Right facial scar has a small area of dimpling in the middle and it looks like a small suture protruding.  I have alerted the patient to get  him to go back to the surgeon to look at this.  Neck is supple without adenopathy in the anterior posterior cervical, internal jugular, supra or infraclavicular, axillary areas.  Chest is clear bilaterally.  Cardiovascular is regular rhythm without murmur rub or gallop.  Abdomen soft, nontender, nondistended.  No gross mass but exam limited by habitus.  Extremities are without cyanosis or edema.  No petechiae or purpura noted.  Neurologically, he is alert and oriented.  He moves all extremities with 5/5 strength.  Sensorium is intact.  Speech is normal in content and miroslava.    Laboratories are performed today.  CBC shows a white count of 54,000.  Platelet count is 126.  Hemoglobin is 13.9.  LDH is normal.  Absolute lymphocyte count has been trending up.  In July his absolute lymphocyte count was 35,000.  In early September was 42.9.  And today is 47.6.  Chemistries are fairly unremarkable.  Normal liver and kidney function.    Impression:    This is a gentleman with chronic lymphocytic leukemia with stage I disease.  He has mild thrombocytopenia.  We have no evidence of an antiplatelet antibody at this time.  I have urged him to return to the care of his oncologist in [place] when he gets back there.  He is leaving next week.  They can help survey for additional skin cancers as well as watch his lymphocyte count and thrombocytopenia.  At this time he has no indications to treat.  I have explained to his wife that the white count although it is increasing is not an indication to treat.  Indications to treat would include symptomatic adenopathy, obstructive adenopathy, worsening anemia and thrombocytopenia.  I explained to her that although his platelet count is somewhat low it is not low enough to indicate treatment.  It is also not low enough to predispose to bleeding.    I have asked him to get a release of information so that his oncologist in [place] can get our information.    I will be happy to resume his  care when he returns to [place] next year.

## 2019-06-14 ENCOUNTER — OFFICE VISIT (OUTPATIENT)
Dept: ONCOLOGY | Facility: CLINIC | Age: 84
End: 2019-06-14
Payer: MEDICARE

## 2019-06-14 ENCOUNTER — APPOINTMENT (OUTPATIENT)
Dept: ONCOLOGY | Facility: CLINIC | Age: 84
End: 2019-06-14
Payer: MEDICARE

## 2019-06-14 VITALS — BODY MASS INDEX: 34.38 KG/M2 | WEIGHT: 213 LBS

## 2019-06-14 DIAGNOSIS — C91.10 CLL (CHRONIC LYMPHOCYTIC LEUKEMIA) (CMS/HCC): ICD-10-CM

## 2019-06-14 LAB
ALBUMIN SERPL-MCNC: 3.8 G/DL (ref 3.5–5.3)
ALP SERPL-CCNC: 58 U/L (ref 45–115)
ALT SERPL-CCNC: 29 U/L (ref 0–52)
ANION GAP SERPL CALC-SCNC: 7 MMOL/L (ref 3–11)
AST SERPL-CCNC: 121 U/L (ref 0–39)
BILIRUB SERPL-MCNC: 0.65 MG/DL (ref 0–1.4)
BUN SERPL-MCNC: 22 MG/DL (ref 7–25)
CALCIUM ALBUM COR SERPL-MCNC: 9.1 MG/DL (ref 8.6–10.3)
CALCIUM SERPL-MCNC: 8.9 MG/DL (ref 8.6–10.3)
CHLORIDE SERPL-SCNC: 106 MMOL/L (ref 98–107)
CO2 SERPL-SCNC: 24 MMOL/L (ref 21–32)
CREAT SERPL-MCNC: 0.98 MG/DL (ref 0.7–1.3)
ERYTHROCYTE [DISTWIDTH] IN BLOOD BY AUTOMATED COUNT: 15.3 % (ref 11.5–15)
GFR SERPL CREATININE-BSD FRML MDRD: 71 ML/MIN/1.73M*2
GLUCOSE SERPL-MCNC: 106 MG/DL (ref 70–105)
HCT VFR BLD AUTO: 39.8 % (ref 38–50)
HGB BLD-MCNC: 13.2 G/DL (ref 13.2–17.2)
LDH SERPL L TO P-CCNC: 365 U/L (ref 87–246)
LYMPHOCYTES # BLD MANUAL: 60.4 10*3/UL
LYMPHOCYTES NFR BLD MANUAL: 95 % (ref 15–47)
MCH RBC QN AUTO: 32.6 PG (ref 29–34)
MCHC RBC AUTO-ENTMCNC: 33 G/DL (ref 32–36)
MCV RBC AUTO: 98.8 FL (ref 82–97)
NEUTROPHILS # BLD MANUAL: 3.18 10*3/UL
NEUTS SEG # BLD MANUAL: 3.2 10*3/UL
NEUTS SEG NFR BLD MANUAL: 5 % (ref 46–70)
PLATELET # BLD AUTO: 118 10*3/UL (ref 130–350)
PLATELET # BLD EST: (no result) 10*3/UL
PMV BLD AUTO: 9.1 FL (ref 6.9–10.8)
POTASSIUM SERPL-SCNC: 4.2 MMOL/L (ref 3.5–5.1)
PROT SERPL-MCNC: 6.1 G/DL (ref 6–8.3)
RBC # BLD AUTO: 4.03 10*6/ΜL (ref 4.1–5.8)
RBC MORPH BLD: NORMAL
SODIUM SERPL-SCNC: 137 MMOL/L (ref 135–145)
TOTAL CELLS COUNTED BLD: 100 CELLS
WBC # BLD AUTO: 63.6 10*3/UL (ref 3.7–9.6)
WBC NRBC COR # BLD: 63.6 10*3/UL

## 2019-06-14 PROCEDURE — 99214 OFFICE O/P EST MOD 30 MIN: CPT | Performed by: NURSE PRACTITIONER

## 2019-06-14 PROCEDURE — 83615 LACTATE (LD) (LDH) ENZYME: CPT

## 2019-06-14 PROCEDURE — 85025 COMPLETE CBC W/AUTO DIFF WBC: CPT

## 2019-06-14 PROCEDURE — 80053 COMPREHEN METABOLIC PANEL: CPT

## 2019-06-14 PROCEDURE — 36415 COLL VENOUS BLD VENIPUNCTURE: CPT

## 2019-06-14 NOTE — PROGRESS NOTES
"Medical Oncology Progress Note  Patient Name: Simba Alcala  YOB: 1934  Date of Service: 6/14/2019    Chief Complaint :  Simba Alcala is a 85 y.o. male with CLL    HISTORY OF PRESENT ILLNESS:  CLL diagnosed in 2006 with lymphadenopathy.  His disease was called stage 0 despite lymphadenopathy.  Followed in [place] until 2010.  White count was 18,000 at that time and platelets were normal.     Returned to [place] and at time of resection of a squamous cancer of his face he had elevated white count and platelets of 118.  Seen by me in July.  White count has hovered between 48,050 6000 now and platelets somewhat low.  Lowest platelet count 94,000.  Workup for ITP was negative.  No clinical bleeding.  PET scan done at the time of his squamous carcinoma showing prominent nodes in the neck without excess FDG uptake as well as splenomegaly, bilateral inguinal nodes.     Squamous carcinomas of the skin on multiple occasions with involvement of left ear requiring removal of ear in 2008 and squamous carcinoma on the right lateral face requiring resection. Followed by Dr. Garg in [place] and Dr. Mg at [facility] in [place].     Patient's wife called yesterday stating he needed to be seen \"because he wasn't feeling well'. Patient presents today after seeing Dr. Mg a couple weeks ago for further care of his  squamous carcinoma on the right lateral face, per patient he needs \"radiation or chemotherapy on my face\". States this is what he needs to be seen for more urgently than his CLL or rising PSA, \"that stuff doesn't matter right now I just don't want to lose my other ear\".     Significant Comorbidity:  Past Medical History:   Diagnosis Date   • Cancer (CMS/HCC) (HCC)     Prostate; CLL   • Kidney disease     40 years ago kidney stone removed   • Wears partial dentures      Past Surgical History:   Procedure Laterality Date   • OTHER SURGICAL HISTORY Left 2008    LEFT EAR REMOVAL;  Mohs " surgeries   • SKIN BIOPSY Right 06/03/2019    Skin shave R tragus, invasive mod diff squamous cell ca with acantholysis, positive margins   • SKIN BIOPSY Right 06/03/2019    R lat cheek, invasive well to mod diff squamous cell ca, positive margins   • SKIN LESION EXCISION Right 06/22/2018    Squamous cell R cheek     Social History:   Social History     Socioeconomic History   • Marital status:      Spouse name: Not on file   • Number of children: Not on file   • Years of education: Not on file   • Highest education level: Not on file   Occupational History   • Not on file   Social Needs   • Financial resource strain: Not on file   • Food insecurity:     Worry: Not on file     Inability: Not on file   • Transportation needs:     Medical: Not on file     Non-medical: Not on file   Tobacco Use   • Smoking status: Never Smoker   • Smokeless tobacco: Never Used   Substance and Sexual Activity   • Alcohol use: Yes     Alcohol/week: 8.4 oz     Types: 14 Shots of liquor per week   • Drug use: Not on file   • Sexual activity: Not on file   Lifestyle   • Physical activity:     Days per week: Not on file     Minutes per session: Not on file   • Stress: Not on file   Relationships   • Social connections:     Talks on phone: Not on file     Gets together: Not on file     Attends Alevism service: Not on file     Active member of club or organization: Not on file     Attends meetings of clubs or organizations: Not on file     Relationship status: Not on file   • Intimate partner violence:     Fear of current or ex partner: Not on file     Emotionally abused: Not on file     Physically abused: Not on file     Forced sexual activity: Not on file   Other Topics Concern   • Not on file   Social History Narrative   • Not on file      Current Medications:    Current Outpatient Medications:   •  omega 3-dha-epa-fish oil 250-500-1,000 mg capsule, Take by mouth., Disp: , Rfl:   •  hydrocortisone 1 % cream, Apply 1 application  "topically 2 (two) times a day., Disp: , Rfl:   •  fluorouracil (EFUDEX) 5 % cream, Apply 1 application topically as needed., Disp: , Rfl:   •  vit A,C & E-lutein-minerals (OCUVITE WITH LUTEIN) 1,000 unit-200 mg-60 unit-2 mg tablet, Take 1 tablet by mouth daily., Disp: , Rfl:   •  pyridoxine HCl, vitamin B6, (VITAMIN B-6 ORAL), Take 1 tablet by mouth daily., Disp: , Rfl:   •  multivitamin (THERAGRAN) tablet tablet, Take 1 tablet by mouth daily., Disp: , Rfl:   Allergies:   Allergies as of 06/14/2019   • (No Known Allergies)     REVIEW OF SYSTEMS:   The ECOG performance status today is .0 - Asymptomatic  Review of Systems   Constitutional: Negative for fever.   Skin: Positive for wound (right lateral face).   Hematological: Positive for adenopathy (\"Dr. Garg said this is a lymph node by my collar bone\").     Pain: 3/10 face      Objective    PHYSICAL EXAM:   Wt 96.6 kg (213 lb)   BMI 34.38 kg/m²    Body mass index is 34.38 kg/m².   Physical Exam   Constitutional: Vital signs are normal.   Pleasant, elderly  male in no acute distress. Alert and cooperative with exam. Oriented to person, place, and time. Accompanied by wife.    HENT:   Head: Normocephalic and atraumatic.   Mouth/Throat: Oropharynx is clear and moist and mucous membranes are normal.   Left ear surgically removed   Open lesions scattered right lateral face from mid-cheek up to ear, tender to palpation    Eyes: Conjunctivae and lids are normal. No scleral icterus.   Neck: Normal range of motion. Neck supple.   Pulmonary/Chest: Effort normal.   Musculoskeletal: Normal range of motion. He exhibits no edema.   Lymphadenopathy:        Right: Supraclavicular (nontender <1 cm mobile) adenopathy present.   Skin: Skin is warm, dry and intact.   Psychiatric: He has a normal mood and affect. His speech is normal.     Imaging  External Ct Result    Result Date: 5/22/2019  Narrative: CT [facility]-5/22/2019    External Nuclear " "Medicine Result    Result Date: 5/23/2019  Narrative: [facility]23/2019    Laboratory  CBC:  Lab Results   Component Value Date    WBC 63.6 (HH) 06/14/2019    HGB 13.2 06/14/2019    HCT 39.8 06/14/2019    MCV 98.8 (H) 06/14/2019     (L) 06/14/2019    ANC 3.180 06/14/2019     CMP:  Lab Results   Component Value Date    GLUCOSE 106 (H) 06/14/2019    CALCIUM 8.9 06/14/2019     06/14/2019    K 4.2 06/14/2019    CO2 24 06/14/2019     06/14/2019    BUN 22 06/14/2019    CREATININE 0.98 06/14/2019    ANIONGAP 7 06/14/2019    BILITOT 0.65 06/14/2019     (H) 06/14/2019    ALT 29 06/14/2019    PROT 6.1 06/14/2019    ALBUMIN 3.8 06/14/2019    ALKPHOS 58 06/14/2019     LDH 6/14/19: 365 U/L        Assessment/Plan    IMPRESSION/PLAN:   1.  Squamous carcinoma on the right lateral face: I was under the impression patient needed to be seen in regard to his CLL but he actually just wants his appointment with Dr. Cao to be rescheduled earlier than it is now, right now the appointment is June 28. Discussed this with Dr. Kiley Lee's nurse who was able to get patient an appointment Wednesday, June 19.     2. CLL: Received visit notes and patient's CT CAP and bone scan results from onoclogy clinic in [place]. Reviewed these all as I thought this is what the visit was in regard to. Discussed all these results and labs with patient and his wife. According to that oncologist he is still stable and no treatment needed at this time for his CLL. Wife states, \"We aren't concerned about that right now we just want his face fixed\".  WBC, hemoglobin and platelets today stable with results form [place] in May. LDH is elevated though today.     3. Rising PSA, hx of prostate cancer:  In these records it appears the CT CAP and bone scan were actually ordered for rising PSA, no evidence of osseous disease. CT CAP showed lymphadenopathy which is consistent with prior scans. Patient " thought these scans were to evaluate CLL.    After long discussion with patient and wife going over records and scans, patient and wife just want to focus on radiation right now. Will discuss everything with Dr. Ghosh on Monday and see patient back for medical oncology visit regarding CLL and PSA.     Will return for follow-up after radiation. Advised to call the clinic with any questions or concerns before next scheduled appointment; patient verbalizes understanding and agrees to plan of care.        Signed by: SATISH KLEIN CNP

## 2019-06-15 ASSESSMENT — ENCOUNTER SYMPTOMS
ADENOPATHY: 1
WOUND: 1
FEVER: 0

## 2019-06-19 ENCOUNTER — OFFICE VISIT NO LOS (OUTPATIENT)
Dept: RADIATION ONCOLOGY | Facility: CLINIC | Age: 84
End: 2019-06-19
Payer: MEDICARE

## 2019-06-19 VITALS
SYSTOLIC BLOOD PRESSURE: 144 MMHG | TEMPERATURE: 97.4 F | BODY MASS INDEX: 33.96 KG/M2 | HEART RATE: 64 BPM | DIASTOLIC BLOOD PRESSURE: 62 MMHG | WEIGHT: 210.4 LBS | OXYGEN SATURATION: 96 %

## 2019-06-19 DIAGNOSIS — C44.329 SQUAMOUS CELL CARCINOMA OF SKIN OF CHEEK: Primary | ICD-10-CM

## 2019-06-19 PROBLEM — C61 PROSTATE CANCER (CMS/HCC): Status: ACTIVE | Noted: 2019-06-19

## 2019-06-19 PROCEDURE — G0463 HOSPITAL OUTPT CLINIC VISIT: HCPCS

## 2019-06-19 ASSESSMENT — PAIN SCALES - GENERAL: PAINLEVEL: 0-NO PAIN

## 2019-06-19 NOTE — PROGRESS NOTES
Radiation Oncology Follow-Up    Date of Service: 6/19/2019      DIAGNOSIS:  1.  Squamous cell carcinoma skin, multiple  2.  CLL  3.  Prostate cancer    ONCOLOGIC HISTORY:  1.  Multiple surgeries, skin grafting, etc  2.  Observation  3.  Proton radiation 2008      CHIEF COMPLAINT:  Nodular progressive skin cancer involving the right cheek and ear    HISTORY OF PRESENT ILLNESS:  I met this patient initially last July after he had undergone a resection of a skin cancer in the right cheek.  There were no particular high risk features after that surgery and so he underwent observation.    According to the patient's wife, he developed a relatively small recurrence over the right cheek during the winter months and had another surgery in February in [place].  He did not heal well after the surgery in February.  He developed nodularity and received steroid injections along the incision.  The nodularity progressed.  He recently returned to [place] and was evaluated by Dr. Mg in dermatology.  2 biopsies over the nodular area on the right cheek and on the right ear were recently carried out in both confirmed the presence of squamous cell carcinoma.  The patient is now referred to discuss radiation treatment.  He has noted some nodularity extending down the right side of his neck and questions the possibility of lymph node involvement in the supraclavicular area on the right.  He states that this is his primary concern and that he is not having any symptoms related to his CLL or to his prostate cancer which is biochemically recurrent based on outside records.       REVIEW OF SYSTEMS:   Review of Systems   All other systems reviewed and are negative.      Pain: none    TOBACCO USE:  Social History     Tobacco Use   Smoking Status Never Smoker   Smokeless Tobacco Never Used     Counseling given: Not Answered      ALLERGIES:   Allergies as of 06/19/2019   • (No Known Allergies)        MEDICATIONS:   Reviewed in  [facility]    PHYSICAL EXAM:   /62   Pulse 64   Temp 36.3 °C (97.4 °F)   Wt 95.4 kg (210 lb 6.4 oz)   SpO2 96%   BMI 33.96 kg/m² Body mass index is 33.96 kg/m².     Physical Exam   Constitutional: No distress.   Eyes: No scleral icterus.   Skin: He is not diaphoretic.   L ear is surgically absent.  R cheek, face, ear, and upper neck demonstrate nodular induration c/w invasive squamous carcinoma with extension to the lower R ear.  There appears to be a satellite lesion on the skin over the mid neck.  There is some nodularity in the supraclavicular region on the right side.       The Karnofsky performance scale today is 80, Normal activity with effort; some signs or symptoms of disease (ECOG equivalent 1).       ASSESSMENT:   85-year-old man with aggressive recurrent squamous cell carcinoma of the skin involving the right cheek and neck    PLAN:   Because of the aggressive and extensive nature of this patient's recurrence, I have recommended a PET CT scan in order to stage to the right face and neck more accurately.  I also recommended radiation to cover the full extent of disease in the right cheek and ear and possible extension into the right neck.  This would involve a least 30 treatments over 6 weeks.  Side effects will include fatigue and skin reaction in the treatment area.  It is possible that he may experience a sore throat and other local symptoms depending on how deep the disease turns out to be.  I have recommended that we initiate planning for radiation as soon as possible.  I do not see the role for any other treatment options because of the extent of disease.    The patient and his wife asked questions and ultimately expressed understanding of the recommendations given today.  The patient inquires about proton therapy.  I do not believe the proton therapy would be any more effective or safe than conventional photon or electron therapy.  The patient ultimately expressed an understanding and agreement  to proceed.  We will schedule a PET scan as soon as possible.    Physician: LIZZIE DRAKE MD

## 2019-06-27 ENCOUNTER — CLINICAL SUPPORT (OUTPATIENT)
Dept: RADIATION ONCOLOGY | Facility: CLINIC | Age: 84
End: 2019-06-27
Payer: MEDICARE

## 2019-06-27 ENCOUNTER — APPOINTMENT (OUTPATIENT)
Dept: RADIATION ONCOLOGY | Facility: CLINIC | Age: 84
End: 2019-06-27
Payer: MEDICARE

## 2019-06-27 PROCEDURE — 77334 RADIATION TREATMENT AID(S): CPT | Performed by: RADIOLOGY

## 2019-07-03 PROCEDURE — 77338 DESIGN MLC DEVICE FOR IMRT: CPT | Performed by: RADIOLOGY

## 2019-07-03 PROCEDURE — 77301 RADIOTHERAPY DOSE PLAN IMRT: CPT | Performed by: RADIOLOGY

## 2019-07-05 PROCEDURE — 77300 RADIATION THERAPY DOSE PLAN: CPT | Performed by: RADIOLOGY

## 2019-07-08 ENCOUNTER — APPOINTMENT (OUTPATIENT)
Dept: RADIATION ONCOLOGY | Facility: CLINIC | Age: 84
End: 2019-07-08
Payer: MEDICARE

## 2019-07-08 DIAGNOSIS — C44.329 SQUAMOUS CELL CARCINOMA OF SKIN OF OTHER PARTS OF FACE: ICD-10-CM

## 2019-07-08 LAB
RAD ONC MSQ ACTUAL FRACTIONS DELIVERED: 1
RAD ONC MSQ ACTUAL SESSION DELIVERED DOSE: 200 CGRAY
RAD ONC MSQ ACTUAL TOTAL DOSE: 200 CGRAY
RAD ONC MSQ ELAPSED DAYS: 0
RAD ONC MSQ LAST DATE: NORMAL
RAD ONC MSQ PRESCRIBED FRACTIONAL DOSE: 200 CGRAY
RAD ONC MSQ PRESCRIBED NUMBER OF FRACTIONS: 30
RAD ONC MSQ PRESCRIBED TECHNIQUE: NORMAL
RAD ONC MSQ PRESCRIBED TOTAL DOSE: 6000 CGRAY
RAD ONC MSQ PRESCRIPTION PATTERN COMMENT: NORMAL
RAD ONC MSQ START DATE: NORMAL
RAD ONC MSQ TREATMENT COURSE NUMBER: 1
RAD ONC MSQ TREATMENT SITE: NORMAL

## 2019-07-08 PROCEDURE — 77386 HC INTENSITY MODULATED RADIATION TX DLVR COMPLEX: CPT | Performed by: RADIOLOGY

## 2019-07-08 PROCEDURE — 77370 RADIATION PHYSICS CONSULT: CPT | Performed by: RADIOLOGY

## 2019-07-08 PROCEDURE — 77331 SPECIAL RADIATION DOSIMETRY: CPT | Performed by: RADIOLOGY

## 2019-07-09 ENCOUNTER — APPOINTMENT (OUTPATIENT)
Dept: RADIATION ONCOLOGY | Facility: CLINIC | Age: 84
End: 2019-07-09
Payer: MEDICARE

## 2019-07-09 ENCOUNTER — RADIATION ONCOLOGY (OUTPATIENT)
Dept: RADIATION ONCOLOGY | Facility: CLINIC | Age: 84
End: 2019-07-09
Payer: MEDICARE

## 2019-07-09 VITALS
HEART RATE: 62 BPM | OXYGEN SATURATION: 95 % | WEIGHT: 211 LBS | DIASTOLIC BLOOD PRESSURE: 77 MMHG | BODY MASS INDEX: 34.06 KG/M2 | SYSTOLIC BLOOD PRESSURE: 144 MMHG | TEMPERATURE: 97.1 F

## 2019-07-09 DIAGNOSIS — C44.329 SQUAMOUS CELL CARCINOMA OF SKIN OF OTHER PARTS OF FACE: ICD-10-CM

## 2019-07-09 LAB
RAD ONC MSQ ACTUAL FRACTIONS DELIVERED: 2
RAD ONC MSQ ACTUAL SESSION DELIVERED DOSE: 200 CGRAY
RAD ONC MSQ ACTUAL TOTAL DOSE: 400 CGRAY
RAD ONC MSQ ELAPSED DAYS: 1
RAD ONC MSQ LAST DATE: NORMAL
RAD ONC MSQ PRESCRIBED FRACTIONAL DOSE: 200 CGRAY
RAD ONC MSQ PRESCRIBED NUMBER OF FRACTIONS: 30
RAD ONC MSQ PRESCRIBED TECHNIQUE: NORMAL
RAD ONC MSQ PRESCRIBED TOTAL DOSE: 6000 CGRAY
RAD ONC MSQ PRESCRIPTION PATTERN COMMENT: NORMAL
RAD ONC MSQ START DATE: NORMAL
RAD ONC MSQ TREATMENT COURSE NUMBER: 1
RAD ONC MSQ TREATMENT SITE: NORMAL

## 2019-07-09 PROCEDURE — 77386 HC INTENSITY MODULATED RADIATION TX DLVR COMPLEX: CPT | Performed by: RADIOLOGY

## 2019-07-10 ENCOUNTER — APPOINTMENT (OUTPATIENT)
Dept: RADIATION ONCOLOGY | Facility: CLINIC | Age: 84
End: 2019-07-10
Payer: MEDICARE

## 2019-07-10 DIAGNOSIS — C44.329 SQUAMOUS CELL CARCINOMA OF SKIN OF OTHER PARTS OF FACE: ICD-10-CM

## 2019-07-10 LAB
RAD ONC MSQ ACTUAL FRACTIONS DELIVERED: 3
RAD ONC MSQ ACTUAL SESSION DELIVERED DOSE: 200 CGRAY
RAD ONC MSQ ACTUAL TOTAL DOSE: 600 CGRAY
RAD ONC MSQ ELAPSED DAYS: 2
RAD ONC MSQ LAST DATE: NORMAL
RAD ONC MSQ PRESCRIBED FRACTIONAL DOSE: 200 CGRAY
RAD ONC MSQ PRESCRIBED NUMBER OF FRACTIONS: 30
RAD ONC MSQ PRESCRIBED TECHNIQUE: NORMAL
RAD ONC MSQ PRESCRIBED TOTAL DOSE: 6000 CGRAY
RAD ONC MSQ PRESCRIPTION PATTERN COMMENT: NORMAL
RAD ONC MSQ START DATE: NORMAL
RAD ONC MSQ TREATMENT COURSE NUMBER: 1
RAD ONC MSQ TREATMENT SITE: NORMAL

## 2019-07-10 PROCEDURE — 77386 HC INTENSITY MODULATED RADIATION TX DLVR COMPLEX: CPT | Performed by: RADIOLOGY

## 2019-07-11 ENCOUNTER — APPOINTMENT (OUTPATIENT)
Dept: RADIATION ONCOLOGY | Facility: CLINIC | Age: 84
End: 2019-07-11
Payer: MEDICARE

## 2019-07-11 DIAGNOSIS — C44.329 SQUAMOUS CELL CARCINOMA OF SKIN OF OTHER PARTS OF FACE: ICD-10-CM

## 2019-07-11 LAB
RAD ONC MSQ ACTUAL FRACTIONS DELIVERED: 4
RAD ONC MSQ ACTUAL SESSION DELIVERED DOSE: 200 CGRAY
RAD ONC MSQ ACTUAL TOTAL DOSE: 800 CGRAY
RAD ONC MSQ ELAPSED DAYS: 3
RAD ONC MSQ LAST DATE: NORMAL
RAD ONC MSQ PRESCRIBED FRACTIONAL DOSE: 200 CGRAY
RAD ONC MSQ PRESCRIBED NUMBER OF FRACTIONS: 30
RAD ONC MSQ PRESCRIBED TECHNIQUE: NORMAL
RAD ONC MSQ PRESCRIBED TOTAL DOSE: 6000 CGRAY
RAD ONC MSQ PRESCRIPTION PATTERN COMMENT: NORMAL
RAD ONC MSQ START DATE: NORMAL
RAD ONC MSQ TREATMENT COURSE NUMBER: 1
RAD ONC MSQ TREATMENT SITE: NORMAL

## 2019-07-11 PROCEDURE — 77386 HC INTENSITY MODULATED RADIATION TX DLVR COMPLEX: CPT | Performed by: RADIOLOGY

## 2019-07-12 ENCOUNTER — APPOINTMENT (OUTPATIENT)
Dept: RADIATION ONCOLOGY | Facility: CLINIC | Age: 84
End: 2019-07-12
Payer: MEDICARE

## 2019-07-12 DIAGNOSIS — C44.329 SQUAMOUS CELL CARCINOMA OF SKIN OF OTHER PARTS OF FACE: ICD-10-CM

## 2019-07-12 LAB
RAD ONC MSQ ACTUAL FRACTIONS DELIVERED: 5
RAD ONC MSQ ACTUAL SESSION DELIVERED DOSE: 200 CGRAY
RAD ONC MSQ ACTUAL TOTAL DOSE: 1000 CGRAY
RAD ONC MSQ ELAPSED DAYS: 4
RAD ONC MSQ LAST DATE: NORMAL
RAD ONC MSQ PRESCRIBED FRACTIONAL DOSE: 200 CGRAY
RAD ONC MSQ PRESCRIBED NUMBER OF FRACTIONS: 30
RAD ONC MSQ PRESCRIBED TECHNIQUE: NORMAL
RAD ONC MSQ PRESCRIBED TOTAL DOSE: 6000 CGRAY
RAD ONC MSQ PRESCRIPTION PATTERN COMMENT: NORMAL
RAD ONC MSQ START DATE: NORMAL
RAD ONC MSQ TREATMENT COURSE NUMBER: 1
RAD ONC MSQ TREATMENT SITE: NORMAL

## 2019-07-12 PROCEDURE — 77336 RADIATION PHYSICS CONSULT: CPT | Performed by: RADIOLOGY

## 2019-07-12 PROCEDURE — 77386 HC INTENSITY MODULATED RADIATION TX DLVR COMPLEX: CPT | Performed by: RADIOLOGY

## 2019-07-15 ENCOUNTER — APPOINTMENT (OUTPATIENT)
Dept: RADIATION ONCOLOGY | Facility: CLINIC | Age: 84
End: 2019-07-15
Payer: MEDICARE

## 2019-07-15 DIAGNOSIS — C44.329 SQUAMOUS CELL CARCINOMA OF SKIN OF OTHER PARTS OF FACE: ICD-10-CM

## 2019-07-15 LAB
RAD ONC MSQ ACTUAL FRACTIONS DELIVERED: 6
RAD ONC MSQ ACTUAL SESSION DELIVERED DOSE: 200 CGRAY
RAD ONC MSQ ACTUAL TOTAL DOSE: 1200 CGRAY
RAD ONC MSQ ELAPSED DAYS: 7
RAD ONC MSQ LAST DATE: NORMAL
RAD ONC MSQ PRESCRIBED FRACTIONAL DOSE: 200 CGRAY
RAD ONC MSQ PRESCRIBED NUMBER OF FRACTIONS: 30
RAD ONC MSQ PRESCRIBED TECHNIQUE: NORMAL
RAD ONC MSQ PRESCRIBED TOTAL DOSE: 6000 CGRAY
RAD ONC MSQ PRESCRIPTION PATTERN COMMENT: NORMAL
RAD ONC MSQ START DATE: NORMAL
RAD ONC MSQ TREATMENT COURSE NUMBER: 1
RAD ONC MSQ TREATMENT SITE: NORMAL

## 2019-07-15 PROCEDURE — 77386 HC INTENSITY MODULATED RADIATION TX DLVR COMPLEX: CPT | Performed by: RADIOLOGY

## 2019-07-16 ENCOUNTER — RADIATION ONCOLOGY (OUTPATIENT)
Dept: RADIATION ONCOLOGY | Facility: CLINIC | Age: 84
End: 2019-07-16
Payer: MEDICARE

## 2019-07-16 ENCOUNTER — APPOINTMENT (OUTPATIENT)
Dept: RADIATION ONCOLOGY | Facility: CLINIC | Age: 84
End: 2019-07-16
Payer: MEDICARE

## 2019-07-16 VITALS
SYSTOLIC BLOOD PRESSURE: 159 MMHG | DIASTOLIC BLOOD PRESSURE: 80 MMHG | WEIGHT: 210.6 LBS | HEART RATE: 59 BPM | TEMPERATURE: 97.3 F | OXYGEN SATURATION: 96 % | BODY MASS INDEX: 33.99 KG/M2

## 2019-07-16 DIAGNOSIS — C44.329 SQUAMOUS CELL CARCINOMA OF SKIN OF OTHER PARTS OF FACE: ICD-10-CM

## 2019-07-16 LAB
RAD ONC MSQ ACTUAL FRACTIONS DELIVERED: 7
RAD ONC MSQ ACTUAL SESSION DELIVERED DOSE: 200 CGRAY
RAD ONC MSQ ACTUAL TOTAL DOSE: 1400 CGRAY
RAD ONC MSQ ELAPSED DAYS: 8
RAD ONC MSQ LAST DATE: NORMAL
RAD ONC MSQ PRESCRIBED FRACTIONAL DOSE: 200 CGRAY
RAD ONC MSQ PRESCRIBED NUMBER OF FRACTIONS: 30
RAD ONC MSQ PRESCRIBED TECHNIQUE: NORMAL
RAD ONC MSQ PRESCRIBED TOTAL DOSE: 6000 CGRAY
RAD ONC MSQ PRESCRIPTION PATTERN COMMENT: NORMAL
RAD ONC MSQ START DATE: NORMAL
RAD ONC MSQ TREATMENT COURSE NUMBER: 1
RAD ONC MSQ TREATMENT SITE: NORMAL

## 2019-07-16 PROCEDURE — 77386 HC INTENSITY MODULATED RADIATION TX DLVR COMPLEX: CPT | Performed by: RADIOLOGY

## 2019-07-16 ASSESSMENT — PAIN SCALES - GENERAL: PAINLEVEL: 0-NO PAIN

## 2019-07-17 ENCOUNTER — APPOINTMENT (OUTPATIENT)
Dept: RADIATION ONCOLOGY | Facility: CLINIC | Age: 84
End: 2019-07-17
Payer: MEDICARE

## 2019-07-17 DIAGNOSIS — C44.329 SQUAMOUS CELL CARCINOMA OF SKIN OF OTHER PARTS OF FACE: ICD-10-CM

## 2019-07-17 LAB
RAD ONC MSQ ACTUAL FRACTIONS DELIVERED: 8
RAD ONC MSQ ACTUAL SESSION DELIVERED DOSE: 200 CGRAY
RAD ONC MSQ ACTUAL TOTAL DOSE: 1600 CGRAY
RAD ONC MSQ ELAPSED DAYS: 9
RAD ONC MSQ LAST DATE: NORMAL
RAD ONC MSQ PRESCRIBED FRACTIONAL DOSE: 200 CGRAY
RAD ONC MSQ PRESCRIBED NUMBER OF FRACTIONS: 30
RAD ONC MSQ PRESCRIBED TECHNIQUE: NORMAL
RAD ONC MSQ PRESCRIBED TOTAL DOSE: 6000 CGRAY
RAD ONC MSQ PRESCRIPTION PATTERN COMMENT: NORMAL
RAD ONC MSQ START DATE: NORMAL
RAD ONC MSQ TREATMENT COURSE NUMBER: 1
RAD ONC MSQ TREATMENT SITE: NORMAL

## 2019-07-17 PROCEDURE — 77386 HC INTENSITY MODULATED RADIATION TX DLVR COMPLEX: CPT | Performed by: RADIOLOGY

## 2019-07-18 ENCOUNTER — APPOINTMENT (OUTPATIENT)
Dept: RADIATION ONCOLOGY | Facility: CLINIC | Age: 84
End: 2019-07-18
Payer: MEDICARE

## 2019-07-18 DIAGNOSIS — C44.329 SQUAMOUS CELL CARCINOMA OF SKIN OF OTHER PARTS OF FACE: ICD-10-CM

## 2019-07-18 LAB
RAD ONC MSQ ACTUAL FRACTIONS DELIVERED: 9
RAD ONC MSQ ACTUAL SESSION DELIVERED DOSE: 200 CGRAY
RAD ONC MSQ ACTUAL TOTAL DOSE: 1800 CGRAY
RAD ONC MSQ ELAPSED DAYS: 10
RAD ONC MSQ LAST DATE: NORMAL
RAD ONC MSQ PRESCRIBED FRACTIONAL DOSE: 200 CGRAY
RAD ONC MSQ PRESCRIBED NUMBER OF FRACTIONS: 30
RAD ONC MSQ PRESCRIBED TECHNIQUE: NORMAL
RAD ONC MSQ PRESCRIBED TOTAL DOSE: 6000 CGRAY
RAD ONC MSQ PRESCRIPTION PATTERN COMMENT: NORMAL
RAD ONC MSQ START DATE: NORMAL
RAD ONC MSQ TREATMENT COURSE NUMBER: 1
RAD ONC MSQ TREATMENT SITE: NORMAL

## 2019-07-18 PROCEDURE — 77386 HC INTENSITY MODULATED RADIATION TX DLVR COMPLEX: CPT | Performed by: RADIOLOGY

## 2019-07-19 ENCOUNTER — APPOINTMENT (OUTPATIENT)
Dept: RADIATION ONCOLOGY | Facility: CLINIC | Age: 84
End: 2019-07-19
Payer: MEDICARE

## 2019-07-19 DIAGNOSIS — C44.329 SQUAMOUS CELL CARCINOMA OF SKIN OF OTHER PARTS OF FACE: ICD-10-CM

## 2019-07-19 LAB
RAD ONC MSQ ACTUAL FRACTIONS DELIVERED: 10
RAD ONC MSQ ACTUAL SESSION DELIVERED DOSE: 200 CGRAY
RAD ONC MSQ ACTUAL TOTAL DOSE: 2000 CGRAY
RAD ONC MSQ ELAPSED DAYS: 11
RAD ONC MSQ LAST DATE: NORMAL
RAD ONC MSQ PRESCRIBED FRACTIONAL DOSE: 200 CGRAY
RAD ONC MSQ PRESCRIBED NUMBER OF FRACTIONS: 30
RAD ONC MSQ PRESCRIBED TECHNIQUE: NORMAL
RAD ONC MSQ PRESCRIBED TOTAL DOSE: 6000 CGRAY
RAD ONC MSQ PRESCRIPTION PATTERN COMMENT: NORMAL
RAD ONC MSQ START DATE: NORMAL
RAD ONC MSQ TREATMENT COURSE NUMBER: 1
RAD ONC MSQ TREATMENT SITE: NORMAL

## 2019-07-19 PROCEDURE — 77386 HC INTENSITY MODULATED RADIATION TX DLVR COMPLEX: CPT | Performed by: RADIOLOGY

## 2019-07-19 PROCEDURE — 77336 RADIATION PHYSICS CONSULT: CPT | Performed by: RADIOLOGY

## 2019-07-22 ENCOUNTER — APPOINTMENT (OUTPATIENT)
Dept: RADIATION ONCOLOGY | Facility: CLINIC | Age: 84
End: 2019-07-22
Payer: MEDICARE

## 2019-07-22 DIAGNOSIS — C44.329 SQUAMOUS CELL CARCINOMA OF SKIN OF OTHER PARTS OF FACE: ICD-10-CM

## 2019-07-22 LAB
RAD ONC MSQ ACTUAL FRACTIONS DELIVERED: 11
RAD ONC MSQ ACTUAL SESSION DELIVERED DOSE: 200 CGRAY
RAD ONC MSQ ACTUAL TOTAL DOSE: 2200 CGRAY
RAD ONC MSQ ELAPSED DAYS: 14
RAD ONC MSQ LAST DATE: NORMAL
RAD ONC MSQ PRESCRIBED FRACTIONAL DOSE: 200 CGRAY
RAD ONC MSQ PRESCRIBED NUMBER OF FRACTIONS: 30
RAD ONC MSQ PRESCRIBED TECHNIQUE: NORMAL
RAD ONC MSQ PRESCRIBED TOTAL DOSE: 6000 CGRAY
RAD ONC MSQ PRESCRIPTION PATTERN COMMENT: NORMAL
RAD ONC MSQ START DATE: NORMAL
RAD ONC MSQ TREATMENT COURSE NUMBER: 1
RAD ONC MSQ TREATMENT SITE: NORMAL

## 2019-07-22 PROCEDURE — 77386 HC INTENSITY MODULATED RADIATION TX DLVR COMPLEX: CPT | Performed by: RADIOLOGY

## 2019-07-23 ENCOUNTER — APPOINTMENT (OUTPATIENT)
Dept: RADIATION ONCOLOGY | Facility: CLINIC | Age: 84
End: 2019-07-23
Payer: MEDICARE

## 2019-07-23 ENCOUNTER — NUTRITION (OUTPATIENT)
Dept: ONCOLOGY | Facility: CLINIC | Age: 84
End: 2019-07-23
Payer: MEDICARE

## 2019-07-23 ENCOUNTER — RADIATION ONCOLOGY (OUTPATIENT)
Dept: RADIATION ONCOLOGY | Facility: CLINIC | Age: 84
End: 2019-07-23
Payer: MEDICARE

## 2019-07-23 VITALS
DIASTOLIC BLOOD PRESSURE: 77 MMHG | BODY MASS INDEX: 33.77 KG/M2 | TEMPERATURE: 97.2 F | HEART RATE: 59 BPM | WEIGHT: 209.2 LBS | SYSTOLIC BLOOD PRESSURE: 171 MMHG

## 2019-07-23 DIAGNOSIS — C44.329 SQUAMOUS CELL CARCINOMA OF SKIN OF OTHER PARTS OF FACE: ICD-10-CM

## 2019-07-23 DIAGNOSIS — C44.329 SQUAMOUS CELL CARCINOMA OF SKIN OF CHEEK: Primary | ICD-10-CM

## 2019-07-23 LAB
RAD ONC MSQ ACTUAL FRACTIONS DELIVERED: 12
RAD ONC MSQ ACTUAL SESSION DELIVERED DOSE: 200 CGRAY
RAD ONC MSQ ACTUAL TOTAL DOSE: 2400 CGRAY
RAD ONC MSQ ELAPSED DAYS: 15
RAD ONC MSQ LAST DATE: NORMAL
RAD ONC MSQ PRESCRIBED FRACTIONAL DOSE: 200 CGRAY
RAD ONC MSQ PRESCRIBED NUMBER OF FRACTIONS: 30
RAD ONC MSQ PRESCRIBED TECHNIQUE: NORMAL
RAD ONC MSQ PRESCRIBED TOTAL DOSE: 6000 CGRAY
RAD ONC MSQ PRESCRIPTION PATTERN COMMENT: NORMAL
RAD ONC MSQ START DATE: NORMAL
RAD ONC MSQ TREATMENT COURSE NUMBER: 1
RAD ONC MSQ TREATMENT SITE: NORMAL

## 2019-07-23 PROCEDURE — 77386 HC INTENSITY MODULATED RADIATION TX DLVR COMPLEX: CPT | Performed by: RADIOLOGY

## 2019-07-23 RX ORDER — CEPHALEXIN 500 MG/1
500 CAPSULE ORAL 3 TIMES DAILY
Qty: 30 CAPSULE | Refills: 0 | Status: SHIPPED | OUTPATIENT
Start: 2019-07-23 | End: 2019-08-06 | Stop reason: ALTCHOICE

## 2019-07-23 NOTE — PROGRESS NOTES
"ONCOLOGY NUTRITION NOTE    PARAMETERS FOR MALNUTRITION:  At risk      NUTRITION PRESCRIPTION:  IBW:56.4kg   ENERGY: 23-63m58MPS 64.1kg= 1475-1600kcals  PROTEIN: 1.2-1.5xIBW= 67-84g  FLUIDS: 1ml/kcals= 1475-1600ml or per provider     NUTRITION DIAGNOSIS:   Inadequate oral intake  Etiology: skin cancer  Signs/Symptoms: side effects of treatment- taste changes, normally only eats 2 meals a day.    MONITORING/EVALUATION:  7/23: Pt usually eats 2 meals a day breakfast and dinner. Pt has been dealing with taste changes but still eating. Pt has been trying to get protein with meals. Wt is slightly down from UBW. Pt does well drinking water, when reminded by water. Pt has no problems drinking milk. Pt needs to be eating 3 meals a day d/t needing food with medications.     NUTRITION INTERVENTIONS:   Chemotherapy Nutrition Therapy Handout given.  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   • CLL (chronic lymphocytic leukemia) (CMS/HCC) (HCC)   • Thrombocytopenia (CMS/HCC) (HCC)   • Prostate cancer (CMS/HCC) (HCC)       Past Medical History:   Diagnosis Date   • Cancer (CMS/HCC) (HCC)     Prostate; CLL   • Kidney disease     40 years ago kidney stone removed   • Wears partial dentures        ANTHROPOMETRICS:  Ht Readings from Last 3 Encounters:   09/20/18 1.676 m (5' 6\")   07/27/18 1.651 m (5' 5\")   07/23/18 1.676 m (5' 6\")       Weight Hx:  Wt Readings from Last 10 Encounters:   07/23/19 94.9 kg (209 lb 3.2 oz)   07/16/19 95.5 kg (210 lb 9.6 oz)   07/09/19 95.7 kg (211 lb)   06/19/19 95.4 kg (210 lb 6.4 oz)   06/14/19 96.6 kg (213 lb)   09/20/18 94.7 kg (208 lb 12.8 oz)   07/27/18 96.6 kg (213 lb)   07/23/18 96.6 kg (213 lb)   06/22/18 96.4 kg (212 lb 8.4 oz) "   ]    UBW: 212lbs  Admit BMI: 33.7  Weight Category: Obese class I            PHYSICAL FINDINGS:              NUTRITION FOCUSED PHYSICAL EXAM (NFPE) -       TYPICAL DIET/INTAKE:  B: eggs, wagner, hash browns, and pancakes.   L: nothing  S: meat, potato, vegetable  Snacks: pt has a cocktail daily and has a snack with that- nuts, chips  Pt likes peanut butter, ice cream, loves milk    FOOD ALLERGIES  No Known Allergies    CULTURAL/Mandaen REQUESTS  None

## 2019-07-24 ENCOUNTER — APPOINTMENT (OUTPATIENT)
Dept: RADIATION ONCOLOGY | Facility: CLINIC | Age: 84
End: 2019-07-24
Payer: MEDICARE

## 2019-07-24 DIAGNOSIS — C44.329 SQUAMOUS CELL CARCINOMA OF SKIN OF OTHER PARTS OF FACE: ICD-10-CM

## 2019-07-24 LAB
RAD ONC MSQ ACTUAL FRACTIONS DELIVERED: 13
RAD ONC MSQ ACTUAL SESSION DELIVERED DOSE: 200 CGRAY
RAD ONC MSQ ACTUAL TOTAL DOSE: 2600 CGRAY
RAD ONC MSQ ELAPSED DAYS: 16
RAD ONC MSQ LAST DATE: NORMAL
RAD ONC MSQ PRESCRIBED FRACTIONAL DOSE: 200 CGRAY
RAD ONC MSQ PRESCRIBED NUMBER OF FRACTIONS: 30
RAD ONC MSQ PRESCRIBED TECHNIQUE: NORMAL
RAD ONC MSQ PRESCRIBED TOTAL DOSE: 6000 CGRAY
RAD ONC MSQ PRESCRIPTION PATTERN COMMENT: NORMAL
RAD ONC MSQ START DATE: NORMAL
RAD ONC MSQ TREATMENT COURSE NUMBER: 1
RAD ONC MSQ TREATMENT SITE: NORMAL

## 2019-07-24 PROCEDURE — 77386 HC INTENSITY MODULATED RADIATION TX DLVR COMPLEX: CPT | Performed by: RADIOLOGY

## 2019-07-25 ENCOUNTER — APPOINTMENT (OUTPATIENT)
Dept: RADIATION ONCOLOGY | Facility: CLINIC | Age: 84
End: 2019-07-25
Payer: MEDICARE

## 2019-07-25 DIAGNOSIS — C44.329 SQUAMOUS CELL CARCINOMA OF SKIN OF OTHER PARTS OF FACE: ICD-10-CM

## 2019-07-25 LAB
RAD ONC MSQ ACTUAL FRACTIONS DELIVERED: 14
RAD ONC MSQ ACTUAL SESSION DELIVERED DOSE: 200 CGRAY
RAD ONC MSQ ACTUAL TOTAL DOSE: 2800 CGRAY
RAD ONC MSQ ELAPSED DAYS: 17
RAD ONC MSQ LAST DATE: NORMAL
RAD ONC MSQ PRESCRIBED FRACTIONAL DOSE: 200 CGRAY
RAD ONC MSQ PRESCRIBED NUMBER OF FRACTIONS: 30
RAD ONC MSQ PRESCRIBED TECHNIQUE: NORMAL
RAD ONC MSQ PRESCRIBED TOTAL DOSE: 6000 CGRAY
RAD ONC MSQ PRESCRIPTION PATTERN COMMENT: NORMAL
RAD ONC MSQ START DATE: NORMAL
RAD ONC MSQ TREATMENT COURSE NUMBER: 1
RAD ONC MSQ TREATMENT SITE: NORMAL

## 2019-07-25 PROCEDURE — 77386 HC INTENSITY MODULATED RADIATION TX DLVR COMPLEX: CPT | Performed by: RADIOLOGY

## 2019-07-26 ENCOUNTER — APPOINTMENT (OUTPATIENT)
Dept: RADIATION ONCOLOGY | Facility: CLINIC | Age: 84
End: 2019-07-26
Payer: MEDICARE

## 2019-07-26 DIAGNOSIS — C44.329 SQUAMOUS CELL CARCINOMA OF SKIN OF OTHER PARTS OF FACE: ICD-10-CM

## 2019-07-26 LAB
RAD ONC MSQ ACTUAL FRACTIONS DELIVERED: 15
RAD ONC MSQ ACTUAL SESSION DELIVERED DOSE: 200 CGRAY
RAD ONC MSQ ACTUAL TOTAL DOSE: 3000 CGRAY
RAD ONC MSQ ELAPSED DAYS: 18
RAD ONC MSQ LAST DATE: NORMAL
RAD ONC MSQ PRESCRIBED FRACTIONAL DOSE: 200 CGRAY
RAD ONC MSQ PRESCRIBED NUMBER OF FRACTIONS: 30
RAD ONC MSQ PRESCRIBED TECHNIQUE: NORMAL
RAD ONC MSQ PRESCRIBED TOTAL DOSE: 6000 CGRAY
RAD ONC MSQ PRESCRIPTION PATTERN COMMENT: NORMAL
RAD ONC MSQ START DATE: NORMAL
RAD ONC MSQ TREATMENT COURSE NUMBER: 1
RAD ONC MSQ TREATMENT SITE: NORMAL

## 2019-07-26 PROCEDURE — 77386 HC INTENSITY MODULATED RADIATION TX DLVR COMPLEX: CPT | Performed by: RADIOLOGY

## 2019-07-26 PROCEDURE — 77336 RADIATION PHYSICS CONSULT: CPT | Performed by: RADIOLOGY

## 2019-07-29 ENCOUNTER — APPOINTMENT (OUTPATIENT)
Dept: RADIATION ONCOLOGY | Facility: CLINIC | Age: 84
End: 2019-07-29
Payer: MEDICARE

## 2019-07-29 DIAGNOSIS — C44.329 SQUAMOUS CELL CARCINOMA OF SKIN OF OTHER PARTS OF FACE: ICD-10-CM

## 2019-07-29 LAB
RAD ONC MSQ ACTUAL FRACTIONS DELIVERED: 16
RAD ONC MSQ ACTUAL SESSION DELIVERED DOSE: 200 CGRAY
RAD ONC MSQ ACTUAL TOTAL DOSE: 3200 CGRAY
RAD ONC MSQ ELAPSED DAYS: 21
RAD ONC MSQ LAST DATE: NORMAL
RAD ONC MSQ PRESCRIBED FRACTIONAL DOSE: 200 CGRAY
RAD ONC MSQ PRESCRIBED NUMBER OF FRACTIONS: 30
RAD ONC MSQ PRESCRIBED TECHNIQUE: NORMAL
RAD ONC MSQ PRESCRIBED TOTAL DOSE: 6000 CGRAY
RAD ONC MSQ PRESCRIPTION PATTERN COMMENT: NORMAL
RAD ONC MSQ START DATE: NORMAL
RAD ONC MSQ TREATMENT COURSE NUMBER: 1
RAD ONC MSQ TREATMENT SITE: NORMAL

## 2019-07-29 PROCEDURE — 77386 HC INTENSITY MODULATED RADIATION TX DLVR COMPLEX: CPT | Performed by: RADIOLOGY

## 2019-07-30 ENCOUNTER — RADIATION ONCOLOGY (OUTPATIENT)
Dept: RADIATION ONCOLOGY | Facility: CLINIC | Age: 84
End: 2019-07-30
Payer: MEDICARE

## 2019-07-30 ENCOUNTER — APPOINTMENT (OUTPATIENT)
Dept: RADIATION ONCOLOGY | Facility: CLINIC | Age: 84
End: 2019-07-30
Payer: MEDICARE

## 2019-07-30 VITALS
WEIGHT: 208.2 LBS | HEART RATE: 56 BPM | BODY MASS INDEX: 33.6 KG/M2 | OXYGEN SATURATION: 94 % | DIASTOLIC BLOOD PRESSURE: 67 MMHG | SYSTOLIC BLOOD PRESSURE: 134 MMHG | TEMPERATURE: 98.2 F

## 2019-07-30 DIAGNOSIS — C44.329 SQUAMOUS CELL CARCINOMA OF SKIN OF OTHER PARTS OF FACE: ICD-10-CM

## 2019-07-30 LAB
RAD ONC MSQ ACTUAL FRACTIONS DELIVERED: 17
RAD ONC MSQ ACTUAL SESSION DELIVERED DOSE: 200 CGRAY
RAD ONC MSQ ACTUAL TOTAL DOSE: 3400 CGRAY
RAD ONC MSQ ELAPSED DAYS: 22
RAD ONC MSQ LAST DATE: NORMAL
RAD ONC MSQ PRESCRIBED FRACTIONAL DOSE: 200 CGRAY
RAD ONC MSQ PRESCRIBED NUMBER OF FRACTIONS: 30
RAD ONC MSQ PRESCRIBED TECHNIQUE: NORMAL
RAD ONC MSQ PRESCRIBED TOTAL DOSE: 6000 CGRAY
RAD ONC MSQ PRESCRIPTION PATTERN COMMENT: NORMAL
RAD ONC MSQ START DATE: NORMAL
RAD ONC MSQ TREATMENT COURSE NUMBER: 1
RAD ONC MSQ TREATMENT SITE: NORMAL

## 2019-07-30 PROCEDURE — 77386 HC INTENSITY MODULATED RADIATION TX DLVR COMPLEX: CPT | Performed by: RADIOLOGY

## 2019-07-30 ASSESSMENT — PAIN SCALES - GENERAL: PAINLEVEL: 0-NO PAIN

## 2019-07-31 ENCOUNTER — APPOINTMENT (OUTPATIENT)
Dept: RADIATION ONCOLOGY | Facility: CLINIC | Age: 84
End: 2019-07-31
Payer: MEDICARE

## 2019-07-31 DIAGNOSIS — C44.329 SQUAMOUS CELL CARCINOMA OF SKIN OF OTHER PARTS OF FACE: ICD-10-CM

## 2019-07-31 LAB
RAD ONC MSQ ACTUAL FRACTIONS DELIVERED: 18
RAD ONC MSQ ACTUAL SESSION DELIVERED DOSE: 200 CGRAY
RAD ONC MSQ ACTUAL TOTAL DOSE: 3600 CGRAY
RAD ONC MSQ ELAPSED DAYS: 23
RAD ONC MSQ LAST DATE: NORMAL
RAD ONC MSQ PRESCRIBED FRACTIONAL DOSE: 200 CGRAY
RAD ONC MSQ PRESCRIBED NUMBER OF FRACTIONS: 30
RAD ONC MSQ PRESCRIBED TECHNIQUE: NORMAL
RAD ONC MSQ PRESCRIBED TOTAL DOSE: 6000 CGRAY
RAD ONC MSQ PRESCRIPTION PATTERN COMMENT: NORMAL
RAD ONC MSQ START DATE: NORMAL
RAD ONC MSQ TREATMENT COURSE NUMBER: 1
RAD ONC MSQ TREATMENT SITE: NORMAL

## 2019-07-31 PROCEDURE — 77386 HC INTENSITY MODULATED RADIATION TX DLVR COMPLEX: CPT | Performed by: RADIOLOGY

## 2019-08-01 ENCOUNTER — APPOINTMENT (OUTPATIENT)
Dept: RADIATION ONCOLOGY | Facility: CLINIC | Age: 84
End: 2019-08-01
Payer: MEDICARE

## 2019-08-01 DIAGNOSIS — C44.329 SQUAMOUS CELL CARCINOMA OF SKIN OF OTHER PARTS OF FACE: ICD-10-CM

## 2019-08-01 LAB
RAD ONC MSQ ACTUAL FRACTIONS DELIVERED: 19
RAD ONC MSQ ACTUAL SESSION DELIVERED DOSE: 200 CGRAY
RAD ONC MSQ ACTUAL TOTAL DOSE: 3800 CGRAY
RAD ONC MSQ ELAPSED DAYS: 24
RAD ONC MSQ LAST DATE: NORMAL
RAD ONC MSQ PRESCRIBED FRACTIONAL DOSE: 200 CGRAY
RAD ONC MSQ PRESCRIBED NUMBER OF FRACTIONS: 30
RAD ONC MSQ PRESCRIBED TECHNIQUE: NORMAL
RAD ONC MSQ PRESCRIBED TOTAL DOSE: 6000 CGRAY
RAD ONC MSQ PRESCRIPTION PATTERN COMMENT: NORMAL
RAD ONC MSQ START DATE: NORMAL
RAD ONC MSQ TREATMENT COURSE NUMBER: 1
RAD ONC MSQ TREATMENT SITE: NORMAL

## 2019-08-01 PROCEDURE — 77386 HC INTENSITY MODULATED RADIATION TX DLVR COMPLEX: CPT | Performed by: RADIOLOGY

## 2019-08-02 ENCOUNTER — APPOINTMENT (OUTPATIENT)
Dept: RADIATION ONCOLOGY | Facility: CLINIC | Age: 84
End: 2019-08-02
Payer: MEDICARE

## 2019-08-02 DIAGNOSIS — C44.329 SQUAMOUS CELL CARCINOMA OF SKIN OF OTHER PARTS OF FACE: ICD-10-CM

## 2019-08-02 LAB
RAD ONC MSQ ACTUAL FRACTIONS DELIVERED: 20
RAD ONC MSQ ACTUAL SESSION DELIVERED DOSE: 200 CGRAY
RAD ONC MSQ ACTUAL TOTAL DOSE: 4000 CGRAY
RAD ONC MSQ ELAPSED DAYS: 25
RAD ONC MSQ LAST DATE: NORMAL
RAD ONC MSQ PRESCRIBED FRACTIONAL DOSE: 200 CGRAY
RAD ONC MSQ PRESCRIBED NUMBER OF FRACTIONS: 30
RAD ONC MSQ PRESCRIBED TECHNIQUE: NORMAL
RAD ONC MSQ PRESCRIBED TOTAL DOSE: 6000 CGRAY
RAD ONC MSQ PRESCRIPTION PATTERN COMMENT: NORMAL
RAD ONC MSQ START DATE: NORMAL
RAD ONC MSQ TREATMENT COURSE NUMBER: 1
RAD ONC MSQ TREATMENT SITE: NORMAL

## 2019-08-02 PROCEDURE — 77336 RADIATION PHYSICS CONSULT: CPT | Performed by: RADIOLOGY

## 2019-08-02 PROCEDURE — 77386 HC INTENSITY MODULATED RADIATION TX DLVR COMPLEX: CPT | Performed by: RADIOLOGY

## 2019-08-05 ENCOUNTER — APPOINTMENT (OUTPATIENT)
Dept: RADIATION ONCOLOGY | Facility: CLINIC | Age: 84
End: 2019-08-05
Payer: MEDICARE

## 2019-08-05 DIAGNOSIS — C44.329 SQUAMOUS CELL CARCINOMA OF SKIN OF OTHER PARTS OF FACE: ICD-10-CM

## 2019-08-05 LAB
RAD ONC MSQ ACTUAL FRACTIONS DELIVERED: 21
RAD ONC MSQ ACTUAL SESSION DELIVERED DOSE: 200 CGRAY
RAD ONC MSQ ACTUAL TOTAL DOSE: 4200 CGRAY
RAD ONC MSQ ELAPSED DAYS: 28
RAD ONC MSQ LAST DATE: NORMAL
RAD ONC MSQ PRESCRIBED FRACTIONAL DOSE: 200 CGRAY
RAD ONC MSQ PRESCRIBED NUMBER OF FRACTIONS: 30
RAD ONC MSQ PRESCRIBED TECHNIQUE: NORMAL
RAD ONC MSQ PRESCRIBED TOTAL DOSE: 6000 CGRAY
RAD ONC MSQ PRESCRIPTION PATTERN COMMENT: NORMAL
RAD ONC MSQ START DATE: NORMAL
RAD ONC MSQ TREATMENT COURSE NUMBER: 1
RAD ONC MSQ TREATMENT SITE: NORMAL

## 2019-08-05 PROCEDURE — 77386 HC INTENSITY MODULATED RADIATION TX DLVR COMPLEX: CPT | Performed by: RADIOLOGY

## 2019-08-06 ENCOUNTER — RADIATION ONCOLOGY (OUTPATIENT)
Dept: RADIATION ONCOLOGY | Facility: CLINIC | Age: 84
End: 2019-08-06
Payer: MEDICARE

## 2019-08-06 ENCOUNTER — APPOINTMENT (OUTPATIENT)
Dept: RADIATION ONCOLOGY | Facility: CLINIC | Age: 84
End: 2019-08-06
Payer: MEDICARE

## 2019-08-06 VITALS
BODY MASS INDEX: 33.22 KG/M2 | OXYGEN SATURATION: 97 % | TEMPERATURE: 97 F | DIASTOLIC BLOOD PRESSURE: 57 MMHG | HEART RATE: 59 BPM | SYSTOLIC BLOOD PRESSURE: 154 MMHG | WEIGHT: 205.8 LBS

## 2019-08-06 DIAGNOSIS — C44.329 SQUAMOUS CELL CARCINOMA OF SKIN OF OTHER PARTS OF FACE: ICD-10-CM

## 2019-08-06 LAB
RAD ONC MSQ ACTUAL FRACTIONS DELIVERED: 22
RAD ONC MSQ ACTUAL SESSION DELIVERED DOSE: 200 CGRAY
RAD ONC MSQ ACTUAL TOTAL DOSE: 4400 CGRAY
RAD ONC MSQ ELAPSED DAYS: 29
RAD ONC MSQ LAST DATE: NORMAL
RAD ONC MSQ PRESCRIBED FRACTIONAL DOSE: 200 CGRAY
RAD ONC MSQ PRESCRIBED NUMBER OF FRACTIONS: 30
RAD ONC MSQ PRESCRIBED TECHNIQUE: NORMAL
RAD ONC MSQ PRESCRIBED TOTAL DOSE: 6000 CGRAY
RAD ONC MSQ PRESCRIPTION PATTERN COMMENT: NORMAL
RAD ONC MSQ START DATE: NORMAL
RAD ONC MSQ TREATMENT COURSE NUMBER: 1
RAD ONC MSQ TREATMENT SITE: NORMAL

## 2019-08-06 PROCEDURE — 77386 HC INTENSITY MODULATED RADIATION TX DLVR COMPLEX: CPT | Performed by: RADIOLOGY

## 2019-08-06 ASSESSMENT — PAIN SCALES - GENERAL: PAINLEVEL: 0-NO PAIN

## 2019-08-07 ENCOUNTER — APPOINTMENT (OUTPATIENT)
Dept: RADIATION ONCOLOGY | Facility: CLINIC | Age: 84
End: 2019-08-07
Payer: MEDICARE

## 2019-08-07 DIAGNOSIS — C44.329 SQUAMOUS CELL CARCINOMA OF SKIN OF OTHER PARTS OF FACE: ICD-10-CM

## 2019-08-07 LAB
RAD ONC MSQ ACTUAL FRACTIONS DELIVERED: 23
RAD ONC MSQ ACTUAL SESSION DELIVERED DOSE: 200 CGRAY
RAD ONC MSQ ACTUAL TOTAL DOSE: 4600 CGRAY
RAD ONC MSQ ELAPSED DAYS: 30
RAD ONC MSQ LAST DATE: NORMAL
RAD ONC MSQ PRESCRIBED FRACTIONAL DOSE: 200 CGRAY
RAD ONC MSQ PRESCRIBED NUMBER OF FRACTIONS: 30
RAD ONC MSQ PRESCRIBED TECHNIQUE: NORMAL
RAD ONC MSQ PRESCRIBED TOTAL DOSE: 6000 CGRAY
RAD ONC MSQ PRESCRIPTION PATTERN COMMENT: NORMAL
RAD ONC MSQ START DATE: NORMAL
RAD ONC MSQ TREATMENT COURSE NUMBER: 1
RAD ONC MSQ TREATMENT SITE: NORMAL

## 2019-08-07 PROCEDURE — 77386 HC INTENSITY MODULATED RADIATION TX DLVR COMPLEX: CPT | Performed by: RADIOLOGY

## 2019-08-08 ENCOUNTER — APPOINTMENT (OUTPATIENT)
Dept: RADIATION ONCOLOGY | Facility: CLINIC | Age: 84
End: 2019-08-08
Payer: MEDICARE

## 2019-08-08 DIAGNOSIS — C44.329 SQUAMOUS CELL CARCINOMA OF SKIN OF OTHER PARTS OF FACE: ICD-10-CM

## 2019-08-08 LAB
RAD ONC MSQ ACTUAL FRACTIONS DELIVERED: 24
RAD ONC MSQ ACTUAL SESSION DELIVERED DOSE: 200 CGRAY
RAD ONC MSQ ACTUAL TOTAL DOSE: 4800 CGRAY
RAD ONC MSQ ELAPSED DAYS: 31
RAD ONC MSQ LAST DATE: NORMAL
RAD ONC MSQ PRESCRIBED FRACTIONAL DOSE: 200 CGRAY
RAD ONC MSQ PRESCRIBED NUMBER OF FRACTIONS: 30
RAD ONC MSQ PRESCRIBED TECHNIQUE: NORMAL
RAD ONC MSQ PRESCRIBED TOTAL DOSE: 6000 CGRAY
RAD ONC MSQ PRESCRIPTION PATTERN COMMENT: NORMAL
RAD ONC MSQ START DATE: NORMAL
RAD ONC MSQ TREATMENT COURSE NUMBER: 1
RAD ONC MSQ TREATMENT SITE: NORMAL

## 2019-08-08 PROCEDURE — 77386 HC INTENSITY MODULATED RADIATION TX DLVR COMPLEX: CPT | Performed by: RADIOLOGY

## 2019-08-09 ENCOUNTER — APPOINTMENT (OUTPATIENT)
Dept: RADIATION ONCOLOGY | Facility: CLINIC | Age: 84
End: 2019-08-09
Payer: MEDICARE

## 2019-08-09 DIAGNOSIS — C44.329 SQUAMOUS CELL CARCINOMA OF SKIN OF OTHER PARTS OF FACE: ICD-10-CM

## 2019-08-09 LAB
RAD ONC MSQ ACTUAL FRACTIONS DELIVERED: 25
RAD ONC MSQ ACTUAL SESSION DELIVERED DOSE: 200 CGRAY
RAD ONC MSQ ACTUAL TOTAL DOSE: 5000 CGRAY
RAD ONC MSQ ELAPSED DAYS: 32
RAD ONC MSQ LAST DATE: NORMAL
RAD ONC MSQ PRESCRIBED FRACTIONAL DOSE: 200 CGRAY
RAD ONC MSQ PRESCRIBED NUMBER OF FRACTIONS: 30
RAD ONC MSQ PRESCRIBED TECHNIQUE: NORMAL
RAD ONC MSQ PRESCRIBED TOTAL DOSE: 6000 CGRAY
RAD ONC MSQ PRESCRIPTION PATTERN COMMENT: NORMAL
RAD ONC MSQ START DATE: NORMAL
RAD ONC MSQ TREATMENT COURSE NUMBER: 1
RAD ONC MSQ TREATMENT SITE: NORMAL

## 2019-08-09 PROCEDURE — 77386 HC INTENSITY MODULATED RADIATION TX DLVR COMPLEX: CPT | Performed by: RADIOLOGY

## 2019-08-09 PROCEDURE — 77336 RADIATION PHYSICS CONSULT: CPT | Performed by: RADIOLOGY

## 2019-08-12 ENCOUNTER — APPOINTMENT (OUTPATIENT)
Dept: RADIATION ONCOLOGY | Facility: CLINIC | Age: 84
End: 2019-08-12
Payer: MEDICARE

## 2019-08-12 DIAGNOSIS — C44.329 SQUAMOUS CELL CARCINOMA OF SKIN OF OTHER PARTS OF FACE: ICD-10-CM

## 2019-08-12 LAB
RAD ONC MSQ ACTUAL FRACTIONS DELIVERED: 26
RAD ONC MSQ ACTUAL SESSION DELIVERED DOSE: 200 CGRAY
RAD ONC MSQ ACTUAL TOTAL DOSE: 5200 CGRAY
RAD ONC MSQ ELAPSED DAYS: 35
RAD ONC MSQ LAST DATE: NORMAL
RAD ONC MSQ PRESCRIBED FRACTIONAL DOSE: 200 CGRAY
RAD ONC MSQ PRESCRIBED NUMBER OF FRACTIONS: 30
RAD ONC MSQ PRESCRIBED TECHNIQUE: NORMAL
RAD ONC MSQ PRESCRIBED TOTAL DOSE: 6000 CGRAY
RAD ONC MSQ PRESCRIPTION PATTERN COMMENT: NORMAL
RAD ONC MSQ START DATE: NORMAL
RAD ONC MSQ TREATMENT COURSE NUMBER: 1
RAD ONC MSQ TREATMENT SITE: NORMAL

## 2019-08-12 PROCEDURE — 77386 HC INTENSITY MODULATED RADIATION TX DLVR COMPLEX: CPT | Performed by: RADIOLOGY

## 2019-08-12 NOTE — PATIENT INSTRUCTIONS
The side effects present at the end of radiation therapy will usually improve within a few weeks after the last treatment.    Try to eat a well-balanced diet over the next several weeks. This can help speed the healing process. If you have been on a special diet for control of side effects, continue with the diet until the side effects go away.    Use a mild soap on the treated skin. Do not scrub off your marks. Let them wear away gradually. You may use moisturizing cream or lotion on the treated skin.    The treated skin will be more sensitive to the sun than the rest of your skin. Avoid direct sunlight to the treated skin as much as possible. Make sure the treated area is covered if you are in the sun for more than ten minutes. If your skin cannot be covered, apply a sunscreen with at least #15 SPF of higher. (It is good to use sunscreen on all of your exposed skin whenever you are in the sun to reduce your risk of skin cancer.)    Fatigue may continue for several weeks. Plan your activities by the way you feel. Take rest periods as needed.     A report of your treatment will be sent to your referring doctor and to your family doctor. It is important to maintain regular follow-up appointments with those doctors.    Call the [facility] and ask to speak to a nurse if you have any of these signs and symptoms.    1. Problems with swallowing or eating.    2. Problems with skin in radiation treatment area.    3. Questions regarding radiation treatments.    There is a doctor on call 24 hours a day. If you need a doctor after hours, call the office phone number at [phone] or [phone] and the answering service will reach the doctor on call.

## 2019-08-13 ENCOUNTER — RADIATION ONCOLOGY (OUTPATIENT)
Dept: RADIATION ONCOLOGY | Facility: CLINIC | Age: 84
End: 2019-08-13
Payer: MEDICARE

## 2019-08-13 ENCOUNTER — NUTRITION (OUTPATIENT)
Dept: ONCOLOGY | Facility: CLINIC | Age: 84
End: 2019-08-13
Payer: MEDICARE

## 2019-08-13 ENCOUNTER — APPOINTMENT (OUTPATIENT)
Dept: RADIATION ONCOLOGY | Facility: CLINIC | Age: 84
End: 2019-08-13
Payer: MEDICARE

## 2019-08-13 VITALS
DIASTOLIC BLOOD PRESSURE: 71 MMHG | OXYGEN SATURATION: 96 % | WEIGHT: 202.2 LBS | SYSTOLIC BLOOD PRESSURE: 153 MMHG | TEMPERATURE: 97.1 F | HEART RATE: 60 BPM | BODY MASS INDEX: 32.64 KG/M2

## 2019-08-13 DIAGNOSIS — C44.329 SQUAMOUS CELL CARCINOMA OF SKIN OF OTHER PARTS OF FACE: ICD-10-CM

## 2019-08-13 LAB
RAD ONC MSQ ACTUAL FRACTIONS DELIVERED: 27
RAD ONC MSQ ACTUAL SESSION DELIVERED DOSE: 200 CGRAY
RAD ONC MSQ ACTUAL TOTAL DOSE: 5400 CGRAY
RAD ONC MSQ ELAPSED DAYS: 36
RAD ONC MSQ LAST DATE: NORMAL
RAD ONC MSQ PRESCRIBED FRACTIONAL DOSE: 200 CGRAY
RAD ONC MSQ PRESCRIBED NUMBER OF FRACTIONS: 30
RAD ONC MSQ PRESCRIBED TECHNIQUE: NORMAL
RAD ONC MSQ PRESCRIBED TOTAL DOSE: 6000 CGRAY
RAD ONC MSQ PRESCRIPTION PATTERN COMMENT: NORMAL
RAD ONC MSQ START DATE: NORMAL
RAD ONC MSQ TREATMENT COURSE NUMBER: 1
RAD ONC MSQ TREATMENT SITE: NORMAL

## 2019-08-13 PROCEDURE — 77386 HC INTENSITY MODULATED RADIATION TX DLVR COMPLEX: CPT | Performed by: RADIOLOGY

## 2019-08-13 ASSESSMENT — PAIN SCALES - GENERAL: PAINLEVEL: 0-NO PAIN

## 2019-08-13 NOTE — PROGRESS NOTES
"ONCOLOGY NUTRITION NOTE    PARAMETERS FOR MALNUTRITION:  At risk        NUTRITION PRESCRIPTION:  IBW:56.4kg       ENERGY: 23-59x08KTW 64.1kg= 1475-1600kcals  PROTEIN: 1.2-1.5xIBW= 67-84g  FLUIDS: 1ml/kcals= 1475-1600ml or per provider      NUTRITION DIAGNOSIS:   Inadequate oral intake  Etiology: skin cancer  Signs/Symptoms: side effects of treatment- taste changes, normally only eats 2 meals a day.     MONITORING/EVALUATION:  7/23: Pt usually eats 2 meals a day breakfast and dinner. Pt has been dealing with taste changes but still eating. Pt has been trying to get protein with meals. Wt is slightly down from UBW. Pt does well drinking water, when reminded by water. Pt has no problems drinking milk. Pt needs to be eating 3 meals a day d/t needing food with medications.   8/10: pt continues to have no taste. States he can sense hot and cold. That's about it. Pt continues to drink milk with meals. Pt drinking supplements in a shake. Wt continues to trend down. Pt has 3 radiation treatments left. Discussed pt's wt trend.     NUTRITION INTERVENTIONS:   · Chemotherapy Nutrition Therapy Handout given.  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   • CLL (chronic lymphocytic leukemia) (CMS/HCC) (HCC)   • Thrombocytopenia (CMS/HCC) (HCC)   • Prostate cancer (CMS/HCC) (HCC)       Past Medical History:   Diagnosis Date   • Cancer (CMS/HCC) (HCC)     Prostate; CLL   • Kidney disease     40 years ago kidney stone removed   • Wears partial dentures        ANTHROPOMETRICS:  Ht Readings from Last 3 Encounters:   09/20/18 1.676 m (5' 6\")   07/27/18 1.651 m (5' 5\")   07/23/18 1.676 m (5' 6\")       Weight Hx:  Wt Readings from Last 10 Encounters:   08/13/19 " 91.7 kg (202 lb 3.2 oz)   08/06/19 93.4 kg (205 lb 12.8 oz)   07/30/19 94.4 kg (208 lb 3.2 oz)   07/23/19 94.9 kg (209 lb 3.2 oz)   07/16/19 95.5 kg (210 lb 9.6 oz)   07/09/19 95.7 kg (211 lb)   06/19/19 95.4 kg (210 lb 6.4 oz)   06/14/19 96.6 kg (213 lb)   09/20/18 94.7 kg (208 lb 12.8 oz)   07/27/18 96.6 kg (213 lb)   ]      Admit BMI:                 PHYSICAL FINDINGS:                FOOD ALLERGIES  No Known Allergies        BIOCHEMICAL DATA:  CO2   Date Value Ref Range Status   06/14/2019 24 21 - 32 mmol/L Final     Creatinine   Date Value Ref Range Status   06/14/2019 0.98 0.70 - 1.30 mg/dL Final     Glucose   Date Value Ref Range Status   06/14/2019 106 (H) 70 - 105 mg/dL Final     Calcium   Date Value Ref Range Status   06/14/2019 8.9 8.6 - 10.3 mg/dL Final     AST   Date Value Ref Range Status   06/14/2019 121 (H) 0 - 39 U/L Final     ALT (SGPT)   Date Value Ref Range Status   06/14/2019 29 0 - 52 U/L Final     Albumin   Date Value Ref Range Status   06/14/2019 3.8 3.5 - 5.3 g/dL Final     eGFR   Date Value Ref Range Status   06/14/2019 71 >60 mL/min/1.73m*2 Final       PERTINENT MEDICATIONS:    Current Outpatient Medications:   •  omega 3-dha-epa-fish oil 250-500-1,000 mg capsule, Take by mouth., Disp: , Rfl:   •  hydrocortisone 1 % cream, Apply 1 application topically 2 (two) times a day., Disp: , Rfl:   •  vit A,C & E-lutein-minerals (OCUVITE WITH LUTEIN) 1,000 unit-200 mg-60 unit-2 mg tablet, Take 1 tablet by mouth daily., Disp: , Rfl:   •  pyridoxine HCl, vitamin B6, (VITAMIN B-6 ORAL), Take 1 tablet by mouth daily., Disp: , Rfl:   •  multivitamin (THERAGRAN) tablet tablet, Take 1 tablet by mouth daily., Disp: , Rfl:     OTHER NOTES:

## 2019-08-14 ENCOUNTER — APPOINTMENT (OUTPATIENT)
Dept: RADIATION ONCOLOGY | Facility: CLINIC | Age: 84
End: 2019-08-14
Payer: MEDICARE

## 2019-08-14 DIAGNOSIS — C44.329 SQUAMOUS CELL CARCINOMA OF SKIN OF OTHER PARTS OF FACE: ICD-10-CM

## 2019-08-14 LAB
RAD ONC MSQ ACTUAL FRACTIONS DELIVERED: 28
RAD ONC MSQ ACTUAL SESSION DELIVERED DOSE: 200 CGRAY
RAD ONC MSQ ACTUAL TOTAL DOSE: 5600 CGRAY
RAD ONC MSQ ELAPSED DAYS: 37
RAD ONC MSQ LAST DATE: NORMAL
RAD ONC MSQ PRESCRIBED FRACTIONAL DOSE: 200 CGRAY
RAD ONC MSQ PRESCRIBED NUMBER OF FRACTIONS: 30
RAD ONC MSQ PRESCRIBED TECHNIQUE: NORMAL
RAD ONC MSQ PRESCRIBED TOTAL DOSE: 6000 CGRAY
RAD ONC MSQ PRESCRIPTION PATTERN COMMENT: NORMAL
RAD ONC MSQ START DATE: NORMAL
RAD ONC MSQ TREATMENT COURSE NUMBER: 1
RAD ONC MSQ TREATMENT SITE: NORMAL

## 2019-08-14 PROCEDURE — 77386 HC INTENSITY MODULATED RADIATION TX DLVR COMPLEX: CPT | Performed by: RADIOLOGY

## 2019-08-15 ENCOUNTER — APPOINTMENT (OUTPATIENT)
Dept: RADIATION ONCOLOGY | Facility: CLINIC | Age: 84
End: 2019-08-15
Payer: MEDICARE

## 2019-08-15 DIAGNOSIS — C44.329 SQUAMOUS CELL CARCINOMA OF SKIN OF OTHER PARTS OF FACE: ICD-10-CM

## 2019-08-15 LAB
RAD ONC MSQ ACTUAL FRACTIONS DELIVERED: 29
RAD ONC MSQ ACTUAL SESSION DELIVERED DOSE: 200 CGRAY
RAD ONC MSQ ACTUAL TOTAL DOSE: 5800 CGRAY
RAD ONC MSQ ELAPSED DAYS: 38
RAD ONC MSQ LAST DATE: NORMAL
RAD ONC MSQ PRESCRIBED FRACTIONAL DOSE: 200 CGRAY
RAD ONC MSQ PRESCRIBED NUMBER OF FRACTIONS: 30
RAD ONC MSQ PRESCRIBED TECHNIQUE: NORMAL
RAD ONC MSQ PRESCRIBED TOTAL DOSE: 6000 CGRAY
RAD ONC MSQ PRESCRIPTION PATTERN COMMENT: NORMAL
RAD ONC MSQ START DATE: NORMAL
RAD ONC MSQ TREATMENT COURSE NUMBER: 1
RAD ONC MSQ TREATMENT SITE: NORMAL

## 2019-08-15 PROCEDURE — 77386 HC INTENSITY MODULATED RADIATION TX DLVR COMPLEX: CPT | Performed by: RADIOLOGY

## 2019-08-16 ENCOUNTER — APPOINTMENT (OUTPATIENT)
Dept: RADIATION ONCOLOGY | Facility: CLINIC | Age: 84
End: 2019-08-16
Payer: MEDICARE

## 2019-08-16 DIAGNOSIS — C44.329 SQUAMOUS CELL CARCINOMA OF SKIN OF OTHER PARTS OF FACE: ICD-10-CM

## 2019-08-16 LAB
RAD ONC MSQ ACTUAL FRACTIONS DELIVERED: 30
RAD ONC MSQ ACTUAL SESSION DELIVERED DOSE: 200 CGRAY
RAD ONC MSQ ACTUAL TOTAL DOSE: 6000 CGRAY
RAD ONC MSQ ELAPSED DAYS: 39
RAD ONC MSQ LAST DATE: NORMAL
RAD ONC MSQ PRESCRIBED FRACTIONAL DOSE: 200 CGRAY
RAD ONC MSQ PRESCRIBED NUMBER OF FRACTIONS: 30
RAD ONC MSQ PRESCRIBED TECHNIQUE: NORMAL
RAD ONC MSQ PRESCRIBED TOTAL DOSE: 6000 CGRAY
RAD ONC MSQ PRESCRIPTION PATTERN COMMENT: NORMAL
RAD ONC MSQ START DATE: NORMAL
RAD ONC MSQ TREATMENT COURSE NUMBER: 1
RAD ONC MSQ TREATMENT SITE: NORMAL

## 2019-08-16 PROCEDURE — 77386 HC INTENSITY MODULATED RADIATION TX DLVR COMPLEX: CPT | Performed by: RADIOLOGY

## 2019-08-16 PROCEDURE — 77336 RADIATION PHYSICS CONSULT: CPT | Performed by: RADIOLOGY

## 2019-08-19 ENCOUNTER — APPOINTMENT (OUTPATIENT)
Dept: RADIATION ONCOLOGY | Facility: CLINIC | Age: 84
End: 2019-08-19
Payer: MEDICARE

## 2019-08-19 PROCEDURE — 77290 THER RAD SIMULAJ FIELD CPLX: CPT | Performed by: RADIOLOGY

## 2019-08-19 PROCEDURE — 77334 RADIATION TREATMENT AID(S): CPT | Performed by: RADIOLOGY

## 2019-08-20 PROCEDURE — 77307 TELETHX ISODOSE PLAN CPLX: CPT | Performed by: RADIOLOGY

## 2019-08-20 PROCEDURE — 77290 THER RAD SIMULAJ FIELD CPLX: CPT | Performed by: RADIOLOGY

## 2019-08-20 PROCEDURE — 77334 RADIATION TREATMENT AID(S): CPT | Performed by: RADIOLOGY

## 2019-08-21 ENCOUNTER — APPOINTMENT (OUTPATIENT)
Dept: RADIATION ONCOLOGY | Facility: CLINIC | Age: 84
End: 2019-08-21
Payer: MEDICARE

## 2019-08-21 DIAGNOSIS — C44.329 SQUAMOUS CELL CARCINOMA OF SKIN OF OTHER PARTS OF FACE: ICD-10-CM

## 2019-08-21 LAB
RAD ONC MSQ ACTUAL FRACTIONS DELIVERED: 1
RAD ONC MSQ ACTUAL FRACTIONS DELIVERED: 1
RAD ONC MSQ ACTUAL SESSION DELIVERED DOSE: 200 CGRAY
RAD ONC MSQ ACTUAL SESSION DELIVERED DOSE: 200 CGRAY
RAD ONC MSQ ACTUAL TOTAL DOSE: 200 CGRAY
RAD ONC MSQ ACTUAL TOTAL DOSE: 200 CGRAY
RAD ONC MSQ ELAPSED DAYS: 0
RAD ONC MSQ ELAPSED DAYS: 0
RAD ONC MSQ LAST DATE: NORMAL
RAD ONC MSQ LAST DATE: NORMAL
RAD ONC MSQ PRESCRIBED FRACTIONAL DOSE: 200 CGRAY
RAD ONC MSQ PRESCRIBED FRACTIONAL DOSE: 200 CGRAY
RAD ONC MSQ PRESCRIBED NUMBER OF FRACTIONS: 5
RAD ONC MSQ PRESCRIBED NUMBER OF FRACTIONS: 5
RAD ONC MSQ PRESCRIBED TECHNIQUE: NORMAL
RAD ONC MSQ PRESCRIBED TECHNIQUE: NORMAL
RAD ONC MSQ PRESCRIBED TOTAL DOSE: 1000 CGRAY
RAD ONC MSQ PRESCRIBED TOTAL DOSE: 1000 CGRAY
RAD ONC MSQ START DATE: NORMAL
RAD ONC MSQ START DATE: NORMAL
RAD ONC MSQ TREATMENT COURSE NUMBER: 1
RAD ONC MSQ TREATMENT COURSE NUMBER: 1
RAD ONC MSQ TREATMENT SITE: NORMAL
RAD ONC MSQ TREATMENT SITE: NORMAL

## 2019-08-21 PROCEDURE — 77321 SPECIAL TELETX PORT PLAN: CPT

## 2019-08-21 PROCEDURE — 77412 RADIATION TX DELIVERY LVL 3: CPT | Performed by: RADIOLOGY

## 2019-08-21 PROCEDURE — 77331 SPECIAL RADIATION DOSIMETRY: CPT | Performed by: RADIOLOGY

## 2019-08-22 ENCOUNTER — APPOINTMENT (OUTPATIENT)
Dept: RADIATION ONCOLOGY | Facility: CLINIC | Age: 84
End: 2019-08-22
Payer: MEDICARE

## 2019-08-22 DIAGNOSIS — C44.329 SQUAMOUS CELL CARCINOMA OF SKIN OF OTHER PARTS OF FACE: ICD-10-CM

## 2019-08-22 LAB
RAD ONC MSQ ACTUAL FRACTIONS DELIVERED: 2
RAD ONC MSQ ACTUAL FRACTIONS DELIVERED: 2
RAD ONC MSQ ACTUAL SESSION DELIVERED DOSE: 200 CGRAY
RAD ONC MSQ ACTUAL SESSION DELIVERED DOSE: 200 CGRAY
RAD ONC MSQ ACTUAL TOTAL DOSE: 400 CGRAY
RAD ONC MSQ ACTUAL TOTAL DOSE: 400 CGRAY
RAD ONC MSQ ELAPSED DAYS: 1
RAD ONC MSQ ELAPSED DAYS: 1
RAD ONC MSQ LAST DATE: NORMAL
RAD ONC MSQ LAST DATE: NORMAL
RAD ONC MSQ PRESCRIBED FRACTIONAL DOSE: 200 CGRAY
RAD ONC MSQ PRESCRIBED FRACTIONAL DOSE: 200 CGRAY
RAD ONC MSQ PRESCRIBED NUMBER OF FRACTIONS: 5
RAD ONC MSQ PRESCRIBED NUMBER OF FRACTIONS: 5
RAD ONC MSQ PRESCRIBED TECHNIQUE: NORMAL
RAD ONC MSQ PRESCRIBED TECHNIQUE: NORMAL
RAD ONC MSQ PRESCRIBED TOTAL DOSE: 1000 CGRAY
RAD ONC MSQ PRESCRIBED TOTAL DOSE: 1000 CGRAY
RAD ONC MSQ START DATE: NORMAL
RAD ONC MSQ START DATE: NORMAL
RAD ONC MSQ TREATMENT COURSE NUMBER: 1
RAD ONC MSQ TREATMENT COURSE NUMBER: 1
RAD ONC MSQ TREATMENT SITE: NORMAL
RAD ONC MSQ TREATMENT SITE: NORMAL

## 2019-08-22 PROCEDURE — 77331 SPECIAL RADIATION DOSIMETRY: CPT | Performed by: RADIOLOGY

## 2019-08-22 PROCEDURE — 77412 RADIATION TX DELIVERY LVL 3: CPT | Performed by: RADIOLOGY

## 2019-08-22 PROCEDURE — 77370 RADIATION PHYSICS CONSULT: CPT | Performed by: RADIOLOGY

## 2019-08-23 ENCOUNTER — APPOINTMENT (OUTPATIENT)
Dept: RADIATION ONCOLOGY | Facility: CLINIC | Age: 84
End: 2019-08-23
Payer: MEDICARE

## 2019-08-23 DIAGNOSIS — C44.329 SQUAMOUS CELL CARCINOMA OF SKIN OF OTHER PARTS OF FACE: ICD-10-CM

## 2019-08-23 LAB
RAD ONC MSQ ACTUAL FRACTIONS DELIVERED: 3
RAD ONC MSQ ACTUAL FRACTIONS DELIVERED: 3
RAD ONC MSQ ACTUAL SESSION DELIVERED DOSE: 200 CGRAY
RAD ONC MSQ ACTUAL SESSION DELIVERED DOSE: 200 CGRAY
RAD ONC MSQ ACTUAL TOTAL DOSE: 600 CGRAY
RAD ONC MSQ ACTUAL TOTAL DOSE: 600 CGRAY
RAD ONC MSQ ELAPSED DAYS: 2
RAD ONC MSQ ELAPSED DAYS: 2
RAD ONC MSQ LAST DATE: NORMAL
RAD ONC MSQ LAST DATE: NORMAL
RAD ONC MSQ PRESCRIBED FRACTIONAL DOSE: 200 CGRAY
RAD ONC MSQ PRESCRIBED FRACTIONAL DOSE: 200 CGRAY
RAD ONC MSQ PRESCRIBED NUMBER OF FRACTIONS: 5
RAD ONC MSQ PRESCRIBED NUMBER OF FRACTIONS: 5
RAD ONC MSQ PRESCRIBED TECHNIQUE: NORMAL
RAD ONC MSQ PRESCRIBED TECHNIQUE: NORMAL
RAD ONC MSQ PRESCRIBED TOTAL DOSE: 1000 CGRAY
RAD ONC MSQ PRESCRIBED TOTAL DOSE: 1000 CGRAY
RAD ONC MSQ START DATE: NORMAL
RAD ONC MSQ START DATE: NORMAL
RAD ONC MSQ TREATMENT COURSE NUMBER: 1
RAD ONC MSQ TREATMENT COURSE NUMBER: 1
RAD ONC MSQ TREATMENT SITE: NORMAL
RAD ONC MSQ TREATMENT SITE: NORMAL

## 2019-08-23 PROCEDURE — 77412 RADIATION TX DELIVERY LVL 3: CPT | Performed by: RADIOLOGY

## 2019-08-26 ENCOUNTER — RADIATION ONCOLOGY (OUTPATIENT)
Dept: RADIATION ONCOLOGY | Facility: CLINIC | Age: 84
End: 2019-08-26
Payer: MEDICARE

## 2019-08-26 ENCOUNTER — APPOINTMENT (OUTPATIENT)
Dept: RADIATION ONCOLOGY | Facility: CLINIC | Age: 84
End: 2019-08-26
Payer: MEDICARE

## 2019-08-26 VITALS
WEIGHT: 199.4 LBS | SYSTOLIC BLOOD PRESSURE: 136 MMHG | TEMPERATURE: 97.8 F | OXYGEN SATURATION: 99 % | DIASTOLIC BLOOD PRESSURE: 64 MMHG | HEART RATE: 56 BPM | BODY MASS INDEX: 32.18 KG/M2

## 2019-08-26 DIAGNOSIS — C44.329 SQUAMOUS CELL CARCINOMA OF SKIN OF OTHER PARTS OF FACE: ICD-10-CM

## 2019-08-26 LAB
RAD ONC MSQ ACTUAL FRACTIONS DELIVERED: 4
RAD ONC MSQ ACTUAL FRACTIONS DELIVERED: 4
RAD ONC MSQ ACTUAL SESSION DELIVERED DOSE: 200 CGRAY
RAD ONC MSQ ACTUAL SESSION DELIVERED DOSE: 200 CGRAY
RAD ONC MSQ ACTUAL TOTAL DOSE: 800 CGRAY
RAD ONC MSQ ACTUAL TOTAL DOSE: 800 CGRAY
RAD ONC MSQ ELAPSED DAYS: 5
RAD ONC MSQ ELAPSED DAYS: 5
RAD ONC MSQ LAST DATE: NORMAL
RAD ONC MSQ LAST DATE: NORMAL
RAD ONC MSQ PRESCRIBED FRACTIONAL DOSE: 200 CGRAY
RAD ONC MSQ PRESCRIBED FRACTIONAL DOSE: 200 CGRAY
RAD ONC MSQ PRESCRIBED NUMBER OF FRACTIONS: 5
RAD ONC MSQ PRESCRIBED NUMBER OF FRACTIONS: 5
RAD ONC MSQ PRESCRIBED TECHNIQUE: NORMAL
RAD ONC MSQ PRESCRIBED TECHNIQUE: NORMAL
RAD ONC MSQ PRESCRIBED TOTAL DOSE: 1000 CGRAY
RAD ONC MSQ PRESCRIBED TOTAL DOSE: 1000 CGRAY
RAD ONC MSQ START DATE: NORMAL
RAD ONC MSQ START DATE: NORMAL
RAD ONC MSQ TREATMENT COURSE NUMBER: 1
RAD ONC MSQ TREATMENT COURSE NUMBER: 1
RAD ONC MSQ TREATMENT SITE: NORMAL
RAD ONC MSQ TREATMENT SITE: NORMAL

## 2019-08-26 PROCEDURE — 77412 RADIATION TX DELIVERY LVL 3: CPT | Performed by: RADIOLOGY

## 2019-08-26 ASSESSMENT — PAIN SCALES - GENERAL: PAINLEVEL: 0-NO PAIN

## 2019-08-26 NOTE — PATIENT INSTRUCTIONS
The side effects present at the end of radiation therapy will usually improve within a few weeks after the last treatment.    Try to eat a well-balanced diet over the next several weeks. This can help speed the healing process. If you have been on a special diet for control of side effects, continue with the diet until the side effects go away.    Use a mild soap on the treated skin. Do not scrub off your marks. Let them wear away gradually. You may use moisturizing cream or lotion on the treated skin.    The treated skin will be more sensitive to the sun than the rest of your skin. Avoid direct sunlight to the treated skin as much as possible. Make sure the treated area is covered if you are in the sun for more than ten minutes. If your skin cannot be covered, apply a sunscreen with at least #15 SPF of higher. (It is good to use sunscreen on all of your exposed skin whenever you are in the sun to reduce your risk of skin cancer.)    Fatigue may continue for several weeks. Plan your activities by the way you feel. Take rest periods as needed.     A report of your treatment will be sent to your referring doctor and to your family doctor. It is important to maintain regular follow-up appointments with those doctors.    Call the [facility] and ask to speak to a nurse if you have any of these signs and symptoms.    1. Problems with swallowing or eating.    2. Problems with skin in radiation treatment area.    3. Questions regarding radiation treatments.    There is a doctor on call 24 hours a day. If you need a doctor after hours, call the office phone number at [phone] or [phone] and the answering service will reach the doctor on call.

## 2019-08-27 ENCOUNTER — APPOINTMENT (OUTPATIENT)
Dept: RADIATION ONCOLOGY | Facility: CLINIC | Age: 84
End: 2019-08-27
Payer: MEDICARE

## 2019-08-27 DIAGNOSIS — C44.329 SQUAMOUS CELL CARCINOMA OF SKIN OF OTHER PARTS OF FACE: ICD-10-CM

## 2019-08-27 LAB
RAD ONC MSQ ACTUAL FRACTIONS DELIVERED: 5
RAD ONC MSQ ACTUAL FRACTIONS DELIVERED: 5
RAD ONC MSQ ACTUAL SESSION DELIVERED DOSE: 200 CGRAY
RAD ONC MSQ ACTUAL SESSION DELIVERED DOSE: 200 CGRAY
RAD ONC MSQ ACTUAL TOTAL DOSE: 1000 CGRAY
RAD ONC MSQ ACTUAL TOTAL DOSE: 1000 CGRAY
RAD ONC MSQ ELAPSED DAYS: 6
RAD ONC MSQ ELAPSED DAYS: 6
RAD ONC MSQ LAST DATE: NORMAL
RAD ONC MSQ LAST DATE: NORMAL
RAD ONC MSQ PRESCRIBED FRACTIONAL DOSE: 200 CGRAY
RAD ONC MSQ PRESCRIBED FRACTIONAL DOSE: 200 CGRAY
RAD ONC MSQ PRESCRIBED NUMBER OF FRACTIONS: 5
RAD ONC MSQ PRESCRIBED NUMBER OF FRACTIONS: 8
RAD ONC MSQ PRESCRIBED TECHNIQUE: NORMAL
RAD ONC MSQ PRESCRIBED TECHNIQUE: NORMAL
RAD ONC MSQ PRESCRIBED TOTAL DOSE: 1000 CGRAY
RAD ONC MSQ PRESCRIBED TOTAL DOSE: 1600 CGRAY
RAD ONC MSQ START DATE: NORMAL
RAD ONC MSQ START DATE: NORMAL
RAD ONC MSQ TREATMENT COURSE NUMBER: 1
RAD ONC MSQ TREATMENT COURSE NUMBER: 1
RAD ONC MSQ TREATMENT SITE: NORMAL
RAD ONC MSQ TREATMENT SITE: NORMAL

## 2019-08-27 PROCEDURE — 77412 RADIATION TX DELIVERY LVL 3: CPT | Performed by: RADIOLOGY

## 2019-08-27 PROCEDURE — 77336 RADIATION PHYSICS CONSULT: CPT | Performed by: RADIOLOGY

## 2019-08-28 ENCOUNTER — APPOINTMENT (OUTPATIENT)
Dept: RADIATION ONCOLOGY | Facility: CLINIC | Age: 84
End: 2019-08-28
Payer: MEDICARE

## 2019-08-28 ENCOUNTER — RADIATION ONCOLOGY (OUTPATIENT)
Dept: RADIATION ONCOLOGY | Facility: CLINIC | Age: 84
End: 2019-08-28
Payer: MEDICARE

## 2019-08-28 VITALS
OXYGEN SATURATION: 96 % | TEMPERATURE: 97.4 F | HEART RATE: 73 BPM | BODY MASS INDEX: 32.05 KG/M2 | SYSTOLIC BLOOD PRESSURE: 145 MMHG | DIASTOLIC BLOOD PRESSURE: 58 MMHG | WEIGHT: 198.6 LBS

## 2019-08-28 DIAGNOSIS — C44.329 SQUAMOUS CELL CARCINOMA OF SKIN OF CHEEK: Primary | ICD-10-CM

## 2019-08-28 DIAGNOSIS — C44.329 SQUAMOUS CELL CARCINOMA OF SKIN OF OTHER PARTS OF FACE: ICD-10-CM

## 2019-08-28 LAB
RAD ONC MSQ ACTUAL FRACTIONS DELIVERED: 6
RAD ONC MSQ ACTUAL SESSION DELIVERED DOSE: 200 CGRAY
RAD ONC MSQ ACTUAL TOTAL DOSE: 1200 CGRAY
RAD ONC MSQ ELAPSED DAYS: 7
RAD ONC MSQ LAST DATE: NORMAL
RAD ONC MSQ PRESCRIBED FRACTIONAL DOSE: 200 CGRAY
RAD ONC MSQ PRESCRIBED NUMBER OF FRACTIONS: 8
RAD ONC MSQ PRESCRIBED TECHNIQUE: NORMAL
RAD ONC MSQ PRESCRIBED TOTAL DOSE: 1600 CGRAY
RAD ONC MSQ START DATE: NORMAL
RAD ONC MSQ TREATMENT COURSE NUMBER: 1
RAD ONC MSQ TREATMENT SITE: NORMAL

## 2019-08-28 PROCEDURE — 77412 RADIATION TX DELIVERY LVL 3: CPT | Performed by: RADIOLOGY

## 2019-08-28 ASSESSMENT — PAIN SCALES - GENERAL: PAINLEVEL: 0-NO PAIN

## 2019-08-29 ENCOUNTER — APPOINTMENT (OUTPATIENT)
Dept: RADIATION ONCOLOGY | Facility: CLINIC | Age: 84
End: 2019-08-29
Payer: MEDICARE

## 2019-08-29 DIAGNOSIS — C44.329 SQUAMOUS CELL CARCINOMA OF SKIN OF OTHER PARTS OF FACE: ICD-10-CM

## 2019-08-29 LAB
RAD ONC MSQ ACTUAL FRACTIONS DELIVERED: 7
RAD ONC MSQ ACTUAL SESSION DELIVERED DOSE: 200 CGRAY
RAD ONC MSQ ACTUAL TOTAL DOSE: 1400 CGRAY
RAD ONC MSQ ELAPSED DAYS: 8
RAD ONC MSQ LAST DATE: NORMAL
RAD ONC MSQ PRESCRIBED FRACTIONAL DOSE: 200 CGRAY
RAD ONC MSQ PRESCRIBED NUMBER OF FRACTIONS: 8
RAD ONC MSQ PRESCRIBED TECHNIQUE: NORMAL
RAD ONC MSQ PRESCRIBED TOTAL DOSE: 1600 CGRAY
RAD ONC MSQ START DATE: NORMAL
RAD ONC MSQ TREATMENT COURSE NUMBER: 1
RAD ONC MSQ TREATMENT SITE: NORMAL

## 2019-08-29 PROCEDURE — 77412 RADIATION TX DELIVERY LVL 3: CPT | Performed by: RADIOLOGY

## 2019-08-30 ENCOUNTER — APPOINTMENT (OUTPATIENT)
Dept: RADIATION ONCOLOGY | Facility: CLINIC | Age: 84
End: 2019-08-30
Payer: MEDICARE

## 2019-08-30 DIAGNOSIS — C44.329 SQUAMOUS CELL CARCINOMA OF SKIN OF OTHER PARTS OF FACE: ICD-10-CM

## 2019-08-30 LAB
RAD ONC MSQ ACTUAL FRACTIONS DELIVERED: 8
RAD ONC MSQ ACTUAL SESSION DELIVERED DOSE: 200 CGRAY
RAD ONC MSQ ACTUAL TOTAL DOSE: 1600 CGRAY
RAD ONC MSQ ELAPSED DAYS: 9
RAD ONC MSQ LAST DATE: NORMAL
RAD ONC MSQ PRESCRIBED FRACTIONAL DOSE: 200 CGRAY
RAD ONC MSQ PRESCRIBED NUMBER OF FRACTIONS: 8
RAD ONC MSQ PRESCRIBED TECHNIQUE: NORMAL
RAD ONC MSQ PRESCRIBED TOTAL DOSE: 1600 CGRAY
RAD ONC MSQ START DATE: NORMAL
RAD ONC MSQ TREATMENT COURSE NUMBER: 1
RAD ONC MSQ TREATMENT SITE: NORMAL

## 2019-08-30 PROCEDURE — 77336 RADIATION PHYSICS CONSULT: CPT

## 2019-08-30 PROCEDURE — 77412 RADIATION TX DELIVERY LVL 3: CPT | Performed by: RADIOLOGY

## 2019-09-17 ENCOUNTER — OFFICE VISIT NO LOS (OUTPATIENT)
Dept: RADIATION ONCOLOGY | Facility: CLINIC | Age: 84
End: 2019-09-17
Payer: MEDICARE

## 2019-09-17 VITALS
HEART RATE: 61 BPM | BODY MASS INDEX: 32.51 KG/M2 | TEMPERATURE: 98.1 F | SYSTOLIC BLOOD PRESSURE: 158 MMHG | DIASTOLIC BLOOD PRESSURE: 82 MMHG | OXYGEN SATURATION: 97 % | WEIGHT: 201.4 LBS

## 2019-09-17 DIAGNOSIS — C44.329 SQUAMOUS CELL CARCINOMA OF SKIN OF CHEEK: ICD-10-CM

## 2019-09-17 PROCEDURE — G0463 HOSPITAL OUTPT CLINIC VISIT: HCPCS

## 2019-09-17 RX ORDER — HYDROCORTISONE 25 MG/G
CREAM TOPICAL
Refills: 1 | COMMUNITY
Start: 2019-09-11

## 2019-09-17 ASSESSMENT — PAIN SCALES - GENERAL: PAINLEVEL: 0-NO PAIN

## 2019-09-17 NOTE — PROGRESS NOTES
Radiation Oncology Follow-Up    Date of Service: 9/17/2019      DIAGNOSIS:  Extensive, nodular squamous cell carcinoma on the skin and subcutaneous tissues of the right face, ear, and neck    ONCOLOGIC HISTORY:  Definitive radiation to the right face, ear, and neck completed in August 2019, with areas of bulky disease receiving 70 Gy      CHIEF COMPLAINT:  Post radiation follow-up    HISTORY OF PRESENT ILLNESS:  Pool states that he is still fatigued but is feeling generally well otherwise.  He is happy with the healing which is taking place in the radiation treatment area.  He still has a couple small nodular lesions, one on the cheek, and 2 other skin lesions on the neck.  These areas continue to improve.  He was recently seen by Dr. Mg in dermatology locally.  He and his wife plan to travel back to [place] within the next 10 days.       REVIEW OF SYSTEMS:   Review of Systems   All other systems reviewed and are negative.      Pain: None reported    TOBACCO USE:  Social History     Tobacco Use   Smoking Status Never Smoker   Smokeless Tobacco Never Used     Counseling given: Not Answered      ALLERGIES:   Allergies as of 09/17/2019   • (No Known Allergies)        MEDICATIONS:   Reviewed in EMR    PHYSICAL EXAM:   /82   Pulse 61   Temp 36.7 °C (98.1 °F)   Wt 91.4 kg (201 lb 6.4 oz)   SpO2 97%   BMI 32.51 kg/m² Body mass index is 32.51 kg/m².     Physical Exam  Constitutional:       General: He is not in acute distress.     Appearance: He is not ill-appearing, toxic-appearing or diaphoretic.     R cheek reveals excellent healing and response but there is a 5 mm skin nodule of uncertain significance.  On the skin over the R mid-neck there are two scale-like raised, erythematous lesions not completely resolved.  Adjacent to the scar in the supraclavicular area there is a palpable, indurated collection of tissue of uncertain significance.  These areas have all received definitive doses of  radiation.    The Karnofsky performance scale today is 90, Able to carry on normal activity; minor signs or symptoms of disease (ECOG equivalent 0).       ASSESSMENT:   85-year-old man with extensive skin cancer history demonstrating a good response to the recently completed radiation    PLAN:   There are still 2 or 3 areas of nodularity on the skin which could represent persistent skin cancer.  It is a bit too early to proceed with alternative treatment options such as surgical resection.  If however these areas of nodularity do not continue to improve, they will need to be evaluated with biopsy and possible surgery.  The patient should pursue this when he arrives in [place].  He states that he already has plans to meet with his dermatologist shortly after his arrival and will discuss these areas at that time.  There is also a possible palpable lymph node in the neck.  The patient should undergo CT imaging of the neck within the next month or so as well.  This can be accomplished in [place].    Physician: LIZZIE DRAKE MD

## 2020-07-17 ENCOUNTER — TELEPHONE (OUTPATIENT)
Dept: ONCOLOGY | Facility: CLINIC | Age: 85
End: 2020-07-17

## 2020-07-17 NOTE — PROGRESS NOTES
Patient cancelled follow up appointment with Rad[facility]. Patient states he is still in [place] and has since entered hospice care there. He requests no further follow up be scheduled.

## 2022-07-30 ENCOUNTER — TELEPHONE ENCOUNTER (OUTPATIENT)
Age: 88
End: 2022-07-30

## 2022-07-31 ENCOUNTER — TELEPHONE ENCOUNTER (OUTPATIENT)
Age: 88
End: 2022-07-31

## (undated) DEVICE — COVER LIGHT HANDLE STERIS

## (undated) DEVICE — SYRINGE 12CC LUER LOCK TIP CTL MONOJECT / RING CONTROL

## (undated) DEVICE — GOWN SURGICAL XL LEVEL 4

## (undated) DEVICE — SOL SOD CHL IRR .9% 1000ML BTL USP PLASTIC POUR BOTTLE

## (undated) DEVICE — SPONGE 4X4" 4PLY BULK

## (undated) DEVICE — TRAY HEAD & NECK CUSTOM RCRH CUSTOM PACK

## (undated) DEVICE — CORD BIPOLAR FORCEP DISP TEMP CONTROL 29-149F

## (undated) DEVICE — SUTURE PLAIN GUT 5-0 PC-1 18" FAST ABSORBING

## (undated) DEVICE — GLOVE SENSICARE SLT 7.0 SURG

## (undated) DEVICE — SUTURE SILK 2-0 SH 30

## (undated) DEVICE — GLOVE SENSICARE LT 8.0 SURG @

## (undated) DEVICE — SYRINGE IRRIGATION ASEPTO BULB @

## (undated) DEVICE — SUTURE VICRYL 4-0 PS-2 18IN UNDYED

## (undated) DEVICE — PAINT STICK PRESAT WITHPVP PROVIDONE IODINE

## (undated) DEVICE — GLOVE 7 DERMAPRENE ULTRA POWDER FREE 8514

## (undated) DEVICE — NEEDLE HYPO 27G 1 1/4IN

## (undated) DEVICE — GLOVE SENSICARE 6.5 SURG

## (undated) DEVICE — STAPLER SKIN WIDE 35

## (undated) DEVICE — GLOVE SENSICARE GREEN 7.0 SURG @ CHANGE TO 124269

## (undated) DEVICE — CONTAINER SPECIMEN 4OZ STL SCREW TOP BLISTER PACK

## (undated) DEVICE — MARKER SKIN DUAL TIP STL PENSCRIBE W/RULER/9 LABELS  1 PEN 2 TIPS